# Patient Record
Sex: MALE | Race: WHITE | NOT HISPANIC OR LATINO | Employment: FULL TIME | URBAN - METROPOLITAN AREA
[De-identification: names, ages, dates, MRNs, and addresses within clinical notes are randomized per-mention and may not be internally consistent; named-entity substitution may affect disease eponyms.]

---

## 2017-05-09 DIAGNOSIS — E70.1 PHENYLKETONURIA (PKU) (H): ICD-10-CM

## 2017-10-10 DIAGNOSIS — E70.1 PHENYLKETONURIA (PKU) (H): ICD-10-CM

## 2017-10-30 ENCOUNTER — TELEPHONE (OUTPATIENT)
Dept: CONSULT | Facility: CLINIC | Age: 32
End: 2017-10-30

## 2017-10-30 NOTE — TELEPHONE ENCOUNTER
[facility] Prior Authorization Team   Phone: [phone]  Fax: [phone]    PA Initiation    Medication: KUVAN 100MG - PA INITIATED  Insurance Company: [employer] - Phone [phone] Fax [phone]  Pharmacy Filling the Rx: [facility] - [place], [place] - [address]  Filling Pharmacy Phone: [phone]  Filling Pharmacy Fax: [phone]  Start Date: 10/30/2017

## 2017-10-31 NOTE — TELEPHONE ENCOUNTER
[facility] Prior Authorization Team   Phone: [phone]  Fax: [phone]    Prior Authorization Approval    Authorization Effective Date: 10/31/2017  Authorization Expiration Date: 11/30/2017  Medication: KUVAN 100MG - [place] Approved  Approved Dose/Quantity: 100mg   Reference #: [id number]   Insurance Company: [facility] - Phone [phone] Fax [phone]  Expected CoPay: 0.00     CoPay Card Available:      Foundation Assistance Needed:    Which Pharmacy is filling the prescription (Not needed for infusion/clinic administered): [facility] - [place], [place] - [address] [place] [address] AVE   Pharmacy Notified: Yes  Patient Notified: Yes    Note: Approved only for 1 month good through 11/30/17, patient may need updated PHE labs done for next Prior Auth.

## 2017-11-01 NOTE — TELEPHONE ENCOUNTER
Junior Love at [facility] PA approved but only for one month  Called insurance to verify why was only approved for one month  -found this was a new insurance coverage as of the new year.  -per insurance this can only be approved for one month due to the patients strength and weight  -will need labs to show medication working for renewal, if approved that would be for 6 mo    Insurance will fax renewal form to show what is all needed on a PA renewal

## 2017-11-24 ENCOUNTER — TELEPHONE (OUTPATIENT)
Dept: CONSULT | Facility: CLINIC | Age: 32
End: 2017-11-24

## 2017-11-24 NOTE — TELEPHONE ENCOUNTER
PA Initiation    Medication: Kuvan 100mg - Initiated  Insurance Company: [employer] [place] - Phone [phone] Fax [phone]  [facility] - [place], [place] - [address]  Filling Pharmacy Phone:    Filling Pharmacy Fax:    Start Date: 11/24/2017

## 2017-11-30 ENCOUNTER — HOSPITAL ENCOUNTER (OUTPATIENT)
Facility: CLINIC | Age: 32
Setting detail: SPECIMEN
Discharge: HOME | End: 2017-11-30
Admitting: PEDIATRICS
Payer: COMMERCIAL

## 2017-11-30 PROCEDURE — 84510 ASSAY OF TYROSINE: CPT | Performed by: PEDIATRICS

## 2017-12-05 LAB
PHE SERPL-MCNC: 5.4 MG/DL (ref 0.5–1.6)
TYROSINE SERPL-MCNC: 1.3 MG/DL (ref 0.6–2.4)

## 2017-12-05 NOTE — TELEPHONE ENCOUNTER
Prior Authorization Approval    Authorization Effective Date: 12/1/2017  Authorization Expiration Date: 6/1/2018  Medication: Kuvan 100mg - Approved  Approved Dose/Quantity: 100mg/ 630  Reference #: [id number]   Insurance Company: [employer] [place] - Phone [phone] Fax [phone]  Expected CoPay:     $0.00  CoPay Card Available:      Foundation Assistance Needed:    Which Pharmacy is filling the prescription (Not needed for infusion/clinic administered): [facility] - [place] [address]  Pharmacy Notified: Yes  Patient Notified: Yes

## 2017-12-08 ENCOUNTER — TELEPHONE (OUTPATIENT)
Dept: NUTRITION | Facility: CLINIC | Age: 32
End: 2017-12-08

## 2017-12-08 DIAGNOSIS — E70.1 PKU (PHENYLKETONURIA) (H): Primary | ICD-10-CM

## 2017-12-08 RX ORDER — NUT.TX FOR PKU WITH IRON NO.41 6 G-80/100
4 LIQUID (ML) ORAL DAILY
Qty: 31000 ML | Refills: 12 | Status: SHIPPED | OUTPATIENT
Start: 2017-12-08 | End: 2018-02-01

## 2017-12-08 NOTE — TELEPHONE ENCOUNTER
PHE result collected 11/30/17 given to patient via phone call = 5.4 mg/dL.      Also received request via My [employer] for updated formula Rx, LOMN, and clinical notes.  Reviewed this with patient and verified he is currently taking 4 Glytactin RTD 15's per day.  Patient was last seen in May 2016 and states he has upcoming visit scheduled for May 2018 and also plans to try to get his sibling (who also has PKU) to come along.  Encouraged patient to attend visit and he denied any other needs at this time.  Updated documentation faxed to [employer]

## 2018-02-01 DIAGNOSIS — E70.1 PKU (PHENYLKETONURIA) (H): ICD-10-CM

## 2018-02-01 RX ORDER — NUT.TX FOR PKU WITH IRON NO.41 6 G-80/100
4 LIQUID (ML) ORAL DAILY
Qty: 31000 ML | Refills: 12 | Status: SHIPPED | OUTPATIENT
Start: 2018-02-01

## 2018-02-20 ENCOUNTER — TELEPHONE (OUTPATIENT)
Dept: PEDIATRICS | Age: 33
End: 2018-02-20

## 2018-03-12 ENCOUNTER — OFFICE VISIT (OUTPATIENT)
Dept: PEDIATRICS | Facility: CLINIC | Age: 33
End: 2018-03-12
Attending: GENETIC COUNSELOR, MS
Payer: COMMERCIAL

## 2018-03-12 ENCOUNTER — OFFICE VISIT (OUTPATIENT)
Dept: PEDIATRICS | Facility: CLINIC | Age: 33
End: 2018-03-12
Attending: PEDIATRICS
Payer: COMMERCIAL

## 2018-03-12 ENCOUNTER — OFFICE VISIT (OUTPATIENT)
Dept: PEDIATRICS | Facility: CLINIC | Age: 33
End: 2018-03-12

## 2018-03-12 ENCOUNTER — ALLIED HEALTH/NURSE VISIT (OUTPATIENT)
Dept: PEDIATRICS | Facility: CLINIC | Age: 33
End: 2018-03-12
Attending: DIETITIAN, REGISTERED
Payer: COMMERCIAL

## 2018-03-12 VITALS
BODY MASS INDEX: 30.92 KG/M2 | SYSTOLIC BLOOD PRESSURE: 125 MMHG | HEIGHT: 74 IN | HEART RATE: 88 BPM | DIASTOLIC BLOOD PRESSURE: 86 MMHG | WEIGHT: 240.96 LBS

## 2018-03-12 DIAGNOSIS — Z71.83 ENCOUNTER FOR NONPROCREATIVE GENETIC COUNSELING: ICD-10-CM

## 2018-03-12 DIAGNOSIS — Z31.5 ENCOUNTER FOR PROCREATIVE GENETIC COUNSELING: ICD-10-CM

## 2018-03-12 DIAGNOSIS — E70.1 PHENYLKETONURIA (PKU) (H): Primary | ICD-10-CM

## 2018-03-12 DIAGNOSIS — R42 DIZZINESS: ICD-10-CM

## 2018-03-12 PROCEDURE — 84510 ASSAY OF TYROSINE: CPT

## 2018-03-12 PROCEDURE — 36415 COLL VENOUS BLD VENIPUNCTURE: CPT

## 2018-03-12 PROCEDURE — 96040 ZZH GENETIC COUNSELING, EACH 30 MINUTES: CPT | Mod: ZF | Performed by: GENETIC COUNSELOR, MS

## 2018-03-12 PROCEDURE — 97803 MED NUTRITION INDIV SUBSEQ: CPT | Mod: 59 | Performed by: DIETITIAN, REGISTERED

## 2018-03-12 PROCEDURE — G0463 HOSPITAL OUTPT CLINIC VISIT: HCPCS | Mod: 25

## 2018-03-12 ASSESSMENT — PAIN SCALES - GENERAL: PAINLEVEL: NO PAIN (0)

## 2018-03-12 NOTE — NURSING NOTE
"Chief Complaint   Patient presents with     RECHECK     follow up       Initial /86 (BP Location: Right arm, Patient Position: Sitting, Cuff Size: Adult Large)  Pulse 88  Ht 6' 1.62\" (187 cm)  Wt 240 lb 15.4 oz (109.3 kg)  HC 61 cm (24.02\")  BMI 31.26 kg/m2 Estimated body mass index is 31.26 kg/(m^2) as calculated from the following:    Height as of this encounter: 6' 1.62\" (187 cm).    Weight as of this encounter: 240 lb 15.4 oz (109.3 kg).  Medication Reconciliation: complete     Jason Pleitez LPN      "

## 2018-03-12 NOTE — PROGRESS NOTES
Advanced Therapies  [facility] [address] [facility] [address]  Phone: [phone]  Fax: [phone]  Date: 2018      Patient:  Mukesh Wolff   :   1985   MRN:     [id number]      Mukesh Wolff  [address]  [facility] [address]    Dear Colleagues at [facility] and Mukesh Wolff,    CHIEF COMPLAINT:     I had the pleasure of seeing Mukesh Wolff in the PKU and Maternal PKU Clinic at the [facility] regarding phenylketonuria (PKU). This patient is a 32 year old and comes for evaluation and treatment.      Since the last visit, there have been no hospitalizations, emergency department visits, or other major changes in medical care.    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  He has gained some weight and would be eligible for an increase in Kuvan medication, however, he plans to lose weight and would like to stay on 21 pills 3 times per day.  His last blood phenylalanine level was sent 2 years ago and was just above 5.  He reports no other changes in his medical condition.  No emergency room visits or hospitalizations, and he is in relatively good, stable health, in his opinion.     FAMILY HISTORY: A brief family medical history was reviewed.  MEDICATIONS:   Current Outpatient Prescriptions   Medication Sig     Nutritional Supplements (GLYTACTIN RTD 15) LIQD Take 4 Packages by mouth daily     sapropterin dihydrochloride (KUVAN) 100 MG TBSO Take 2,100 mg by mouth daily .Take 21 tablets by mouth once daily with a meal. May crush tablets and mix with food or applesauce.     No current facility-administered medications for this visit.      REVIEW OF  "SYSTEMS: The review of systems negative for new eye, ear, heart, lung, liver, spleen, gastrointestinal, bone, muscle, integumentary, endocrinologic, brain or psychiatric issues except as noted above.  PHYSICAL EXAMINATION:  Demographics: /86 (BP Location: Right arm, Patient Position: Sitting, Cuff Size: Adult Large)  Pulse 88  Ht 6' 1.62\" (187 cm)  Wt 240 lb 15.4 oz (109.3 kg)  HC 61 cm (24.02\")  BMI 31.26 kg/m2  General: The patient is oriented to person, place and time at an age-appropriate manner.   HEENT: The facial features are normal and symmetric.   The gaze is conjugate and extraocular motions are full and intact.The pupils are equal, round and reactive to light.  The ears are of normal position and configuration and hearing is grossly normal.  The oropharynx is benign and the tongue protrudes normally without fasciculations.  Neck: The neck is supple with full range of motion  Chest: The chest is of normal configuration and clear by auscultation.   Heart: A normal S1 and S2 are heard without murmurs or gallops.  Abdomen: The abdomen is soft and benign without organomegaly.   Extremities: The extremities are of normal configuration without contractures nor hyperlaxities. Strength and tone appear to be normal and symmetric. Deep tendon reflexes are normal at the biceps, patellar and ankles, and there is no clonus at the ankles.   Integument: The integument is  of normal appearance without significant changes in pigmentation, birthmarks, or lesions.  Neurologic:  Mental Status Exam:  Alert, awake. Fully oriented. No dysarthria, no dysphasia. Speech of normal fluency.  Cranial Nerves:  PERRLA, EOMs intact, no nystagmus, facial movements symmetric. No atrophy or fasciculations.    Motor:  Normal tone in all four extremities, no atrophy or fasciculations. 5/5 strength bilaterally in shoulder abduction, elbow extensors and flexors, , hip flexors, knee extensors and flexors. No tremors.  Sensory:  " "Negative Romberg.  Reflexes:  2+ and symmetric in biceps, patellar, Achilles; There is no clonus at the ankles.  Gait:  Normal gait; normal arm swing and stance.ankles.    LABORATORY RESULTS: Previous studies showed pathologically elevated blood phenylalanine levels as well as specific PAH mutatons  and excluded disorders of biopterin recycling. Laboratory studies from the past year were reviewed.    Addendum (March 18, 2018): On a specimen obtained today, Mukesh's phenylalaine level was found to be surprisingly elevated at 18.8 mg/dl.    ASSESSMENT:  1.) Phenylketonuria (PKU).  2.) In the past, there has been excellent control with blood phe levels aiming at levels of 6 mg/dL or lower. Blood phe levels at 5.4 mg/dl since last visit. However, today's level is surprisingly and alarmingly high.     PLAN/RECOMMENDATIONS:    1.) Send blood \"tyrosine and phenylalanine\" levels monthly.  2.) Metabolic PKU Dietician Consult today for regular diet assessment and nutritional management including the patient's prescribed phenylalanine intake. I recommend that Mr. Mendez contact the Metabolic Dietician, and make adjustments to reduce blood phenylalanine to the previous, low levels below 6 mg/dl.  3.) Clinical Pharmacotherapy consultation with Pharmacotherapy for Inborn Errors of Metabolism (PIMD) expert regarding sapropterin dihydrochloride medication for PKU.  4.) Continue low phenylalanine diet (250-400 mg phenylalanine/day, or as modified by consultation with the Metabolic PKU Dietician considering recent blood phenylalanine levels, diet history, and impact of sapropterin, if taken).  5..) Return to PKU Clinic in 12 months, or consider coming earlier in 3-4 months to check on his progress.    FOLLOW-UP PLAN:  If you are returning to clinic to review specific laboratory tests, please call the Genetic Counselor (see phone numbers below) to confirm that we have received all of the results from reference laboratories prior to " "your appointment. If we have not received all of the test results, please discuss re-scheduling your appointment.    I spent 25 minutes face-to-face with the patient reviewing the chief complaint, past medical history, and obtaining a review of systems as well as doing a physical examination; more than 50% of this time was spent in counseling and education regarding Maternal PKU,  the availability of the local patient support group with information on the [place] PKU Foundation available at www.[place]pku.org as well as the importance of maintaining blood phenylalanine levels at 6 mg/dl or less.     With warmest regards,     Augusto Bryant PhD MD  Professor of Pediatrics, and  [place] [employer]      Appointments: [phone]      Monday mornings: Advanced Therapies for Lysosomal Diseases Clinic   Monday afternoons: PKU Clinic, Metabolism Clinic, and Genetics Clinic    Nurse Coordinator, Metabolism and Genetics (afternoon clinics):  Kimberlee Ferraro MA, RN, PHN  [phone]    Pharmacotherapy Consultant:  Daniela Felix PharmD, Pharmacotherapy for Metabolic Disorders (PIMD): [phone]  [facility] Lucy, PharmD, Pharmacotherapy for Metabolic Disorders (PIMD): [phone]    Genetic Counselor:  Kimberlee Renteria MS, [facility] (Genetic test Results): [phone]  Ginny Sanchez MS, GC, (Genetic test results: [phone])    Metabolic Dietician:  Julianna Villa, Registered Dietician: [phone]    :  CORDELL Villalobos, [facility], AC, Clinical , [phone]    Advanced Therapies Clinic Scheduler:  Jacki Cisneros, [phone]      Copy to Practitioner:  [facility]  Moisés Baumann[address] [place] [address]    Copy to patient:  Mukesh Wolff  [address]  [facility] [address]      "

## 2018-03-12 NOTE — LETTER
3/12/2018      RE: Mukesh Wolff  [address] [facility] [address]       Pharmacotherapy Consultation:    S/O:  Mukesh Wolff is a 32 year old male coming to clinic for a follow-up visit (evaluation and treatment) of PKU. The patient's last visit to the PKU clinic was on 5/12/16.     Assessment:    1. PKU -   PKU Genotype:    Mukesh participated in the START trial in 2009 and was determined to be a responder ( > 20% reduction in phe levels with Kuvan  Vs. Placebo). He is currently on 2100mg Kuvan everyday. Based on today's weight, 109.3kg, his Kuvan dose could be increased to 2200mg Kuvan/day (recommended dose: 20mg/kg). Patient would like to keep the dose at 2100mg (21 tablets of 100mg Kuvan) a day because he is working to get his weight down.     Mukesh doesn't have concerns regarding Kuvan tablets such as counting Kuvan tablets everyday and taking medications, but would like to try powder form for few weeks.     Today, he was interested to know more about Pegvaliase because he heard that Pegvaliase could allow more protein intake while keeping phenylalanine in good levels. Pegvaliase is an investigational enzyme substitution therapy for the management of PKU. It is currently under FDA review for possible use in PKU patients.     Current labs include:    Phenylalanine mg/dl   Date Value Ref Range Status   03/12/2018 11.8 (H) 0.5 - 1.6 mg/dL Final   11/30/2017 5.4 (H) 0.5 - 1.6 mg/dL Final   06/20/2016 4.4 (H) 0.5 - 1.6 mg/dL Final     Comment:     Filter paper dried insufficiently before closing.  Blood leaked onto cardboard   cover.  Interpret results with caution.     05/12/2016 12.2 (H) 0.5 - 1.6 mg/dL Final   06/20/2013 17.5 (H) 0.5 - 1.6 mg/dL Final   10/24/2009 11.5 (H) 0.5 - 1.6 mg/dL Final   10/17/2009 8.6 (H) 0.5 - 1.6 mg/dL Final     Comment:     Filter paper dried insufficiently before closing.  Blood leaked onto cardboard   cover.  Interpret results with caution.   10/10/2009 12.5 (H)  0.5 - 1.6 mg/dL Final   10/03/2009 12.4 (H) 0.5 - 1.6 mg/dL Final   09/26/2009 19.0 (H) 0.5 - 1.6 mg/dL Final     Comment:     Filter paper dried insufficiently before closing.  Blood leaked onto cardboard   cover.  Interpret results with caution.   09/12/2009 15.6 (H) 0.5 - 1.6 mg/dL Final   08/29/2009 15.7 (H) 0.5 - 1.6 mg/dL Final   07/25/2009 11.4 (H) 0.5 - 1.6 mg/dL Final   07/18/2009 10.0 (H) 0.5 - 1.6 mg/dL Final   07/11/2009 11.6 (H) 0.5 - 1.6 mg/dL Final   07/04/2009 8.6 (H) 0.5 - 1.6 mg/dL Final   06/27/2009 16.4 (H) 0.5 - 1.6 mg/dL Final   05/02/2009 15.7 (H) 0.5 - 1.6 mg/dL Final   04/25/2009 13.9 (H) 0.5 - 1.6 mg/dL Final   04/18/2009 12.9 (H) 0.5 - 1.6 mg/dL Final   04/11/2009 9.4 (H) 0.5 - 1.6 mg/dL Final     Comment:     Filter paper dried insufficiently before closing.  Blood leaked onto cardboard   cover.  Interpret results with caution.   04/04/2009 17.0 (H) 0.5 - 1.6 mg/dL Final     Comment:     Filter paper dried insufficiently before closing.  Blood leaked onto cardboard   cover.  Interpret results with caution.   03/19/2009 14.4 (H) 0.5 - 1.6 mg/dL Final   01/26/2007 14.4 (H) 0.5 - 1.6 mg/dL Final   08/01/2005 8.3 (H) 0.5 - 1.6 mg/dL Final   06/11/2005 13.1 (H) 0.5 - 1.6 mg/dL Final     Medication reconciliation:   A review of the patient's existing medication list was performed to ensure that it is up to date.     Current Outpatient Prescriptions   Medication     Nutritional Supplements (GLYTACTIN RTD 15) LIQD     sapropterin dihydrochloride (KUVAN) 100 MG TBSO     No current facility-administered medications for this visit.         Pharmacotherapy Plan:  1) Continue taking Kuvan: 21 tabs of Kuvan 100 mg tablets for a total dose of 2100 mg per day. Take each dose with a full meal.  2) Contact [facility] regarding patient's interest in trying Kuvan powder form and possible supply for some trial medications  3) Continue working with PKU Clinic dieticians to maintain low-phe diet in order to  stabilize phe levels within the goal range of 2 - 6 mg/dl.   4) Order labs to obtain a phe level following today's appointment.   5) RTC in 1 year for a follow-up appointment with Dr. Tha Bryant at the PKU Clinic.       Maureen Ayala, XavierD Postdoctoral Fellow  Pharmacotherapy of Inherited Metabolic Disorders (PIMD)    Pharmacotherapy consultation per collaborative practice agreement with Dr. Tha Bryant MD PhD       Maureen Ayala, LTAC, located within [facility]

## 2018-03-12 NOTE — MR AVS SNAPSHOT
MRN:[id number]                      After Visit Summary   3/12/2018    Mukesh Wolff    MRN: [id number]           Visit Information        Provider Department      3/12/2018 3:00 PM Julianna Villa RD Peds PKABNER        Your next 10 appointments already scheduled     Mar 11, 2019  2:30 PM CDT   Return Visit with MD Lisa Pro ([facility])    [facility]  [address] [facility] [facility]  [address] [facility] [address]   [phone]              MyChart Information     [employer] gives you secure access to your electronic health record. If you see a primary care provider, you can also send messages to your care team and make appointments. If you have questions, please call your primary care clinic.  If you do not have a primary care provider, please call [phone] and they will assist you.      [employer] is an electronic gateway that provides easy, online access to your medical records. With [employer], you can request a clinic appointment, read your test results, renew a prescription or communicate with your care team.     To access your existing account, please contact your [facility] or call [phone] for assistance.        Care EveryWhere ID     This is your Care EveryWhere ID. This could be used by other organizations to access your [facility] medical records  [id number]        Equal Access to Services     AMADO VILLA : Hadyousif parisho Sonii, waaxda luqadaha, qaybta kaalmada adeegyajorge, lachelle mckeon. So [facility] [phone].    ATENCIÓN: Si habla español, tiene a mei disposición servicios gratuitos de asistencia lingüística. Llame al [phone].    We comply with applicable federal civil rights laws and [place] laws. We do not discriminate on the basis of race, color, national origin, age, disability, sex, sexual orientation, or gender identity.

## 2018-03-12 NOTE — MR AVS SNAPSHOT
After Visit Summary   3/12/2018    Mukesh Wolff    MRN: [id number]           Patient Information     Date Of Birth          1985        Visit Information        Provider Department      3/12/2018 1:00 PM Tha Bryant MD Peds PKU        Today's Diagnoses     Phenylketonuria (PKU) (H)    -  1    Dizziness           Follow-ups after your visit        Additional Services     OTOLARYNGOLOGY REFERRAL       1.) Your provider has referred you to: [facility]: Adult Ear, Nose and Throat Clinic (Otolaryngology) [facility] [phone]  [email or web address]    2.) Or [facility]    Please be aware that coverage of these services is subject to the terms and limitations of your health insurance plan.  Call member services at your health plan with any benefit or coverage questions.      Please bring the following with you to your appointment:    (1) Any X-Rays, CTs or MRIs which have been performed.  Contact the facility where they were done to arrange for  prior to your scheduled appointment.   (2) List of current medications  (3) This referral request   (4) Any documents/labs given to you for this referral                  Follow-up notes from your care team     Return in about 1 year (around 3/12/2019).      Your next 10 appointments already scheduled     Mar 11, 2019  2:30 PM CDT   Return Visit with MD Lisa Pro PKU ([facility])    [place] [address] [facility], [address] [place] [address]  [address] [place] [address] [phone]              Who to contact     Please call your clinic at [phone] to:    Ask questions about your health    Make or cancel appointments    Discuss your medicines    Learn about your test results    Speak to your doctor            Additional Information About Your Visit        MyChart Information     [employer] gives you secure access to your electronic health record. If you see a primary  "care provider, you can also send messages to your care team and make appointments. If you have questions, please call your primary care clinic.  If you do not have a primary care provider, please call [phone] and they will assist you.      [employer] is an electronic gateway that provides easy, online access to your medical records. With [employer], you can request a clinic appointment, read your test results, renew a prescription or communicate with your care team.     To access your existing account, please contact your [facility] or call [phone] for assistance.        Care EveryWhere ID     This is your Care EveryWhere ID. This could be used by other organizations to access your [facility] medical records  [id number]        Your Vitals Were     Pulse Height Head Circumference BMI (Body Mass Index)          88 6' 1.62\" (187 cm) 61 cm (24.02\") 31.26 kg/m2         Blood Pressure from Last 3 Encounters:   03/12/18 125/86   05/12/16 147/87   06/20/13 117/67    Weight from Last 3 Encounters:   03/12/18 240 lb 15.4 oz (109.3 kg)   05/12/16 231 lb 7.7 oz (105 kg)   06/20/13 217 lb (98.4 kg)              We Performed the Following     OTOLARYNGOLOGY REFERRAL     Tyrosine and phenylalanine        Primary Care Provider Fax #    Physician No Ref-Primary [phone]       No address on file        Equal Access to Services     AMADO VILLA : Hadii sameera ku hadasho Soomaali, waaxda luqadaha, qaybta kaalmada denis, lachelle lynn . So [facility] [phone].    ATENCIÓN: Si habla español, tiene a mei disposición servicios gratuitos de asistencia lingüística. Neena al [phone].    We comply with applicable federal civil rights laws and [place] laws. We do not discriminate on the basis of race, color, national origin, age, disability, sex, sexual orientation, or gender identity.            Thank you!     Thank you for choosing PEDS PKU  for your care. Our goal is " always to provide you with excellent care. Hearing back from our patients is one way we can continue to improve our services. Please take a few minutes to complete the written survey that you may receive in the mail after your visit with us. Thank you!             Your Updated Medication List - Protect others around you: Learn how to safely use, store and throw away your medicines at www.disposemymeds.org.          This list is accurate as of 3/12/18  2:45 PM.  Always use your most recent med list.                   Brand Name Dispense Instructions for use Diagnosis    GLYTACTIN RTD 15 Liqd     56943 mL    Take 4 Packages by mouth daily    PKU (phenylketonuria) (H)       sapropterin dihydrochloride 100 MG Tbso    KUVAN    630 tablet    Take 2,100 mg by mouth daily .Take 21 tablets by mouth once daily with a meal. May crush tablets and mix with food or applesauce.    Phenylketonuria (PKU) (H)

## 2018-03-12 NOTE — MR AVS SNAPSHOT
After Visit Summary   3/12/2018    Mukesh Wolff    MRN: [id number]           Patient Information     Date Of Birth          1985        Visit Information        Provider Department      3/12/2018 1:15 PM Kimberlee Renteria GC Peds PKU        Today's Diagnoses     Phenylketonuria (PKU) (H)    -  1    Encounter for nonprocreative genetic counseling        Encounter for procreative genetic counseling           Follow-ups after your visit        Your next 10 appointments already scheduled     Mar 11, 2019  2:30 PM CDT   Return Visit with MD Lisa Pro PKU ([facility])    [facility]  [address] [facility]  [address] [facility] [id number] [phone]              Who to contact     Please call your clinic at [phone] to:    Ask questions about your health    Make or cancel appointments    Discuss your medicines    Learn about your test results    Speak to your doctor            Additional Information About Your Visit        My[employer]in[employer] Information     [employer] gives you secure access to your electronic health record. If you see a primary care provider, you can also send messages to your care team and make appointments. If you have questions, please call your primary care clinic.  If you do not have a primary care provider, please call [phone] and they will assist you.      [employer] is an electronic gateway that provides easy, online access to your medical records. With [employer], you can request a clinic appointment, read your test results, renew a prescription or communicate with your care team.     To access your existing account, please contact your [facility] or call [phone] for assistance.        Care EveryWhere ID     This is your Care EveryWhere ID. This could be used by other organizations to access your [place] medical records  [id number]         Blood Pressure from Last 3 Encounters:   03/12/18 125/86   05/12/16 147/87    06/20/13 117/67    Weight from Last 3 Encounters:   03/12/18 240 lb 15.4 oz (109.3 kg)   05/12/16 231 lb 7.7 oz (105 kg)   06/20/13 217 lb (98.4 kg)              Today, you had the following     No orders found for display       Primary Care Provider Fax #    Physician No Ref-Primary [phone]       No address on file        Equal Access to Services     FABIANA VILLA : Hadii aad ku hadasho Soomaali, waaxda luqadaha, qaybta kaalmada adeegyada, waxay idiin jackelynn adetori enciso lajoeycatalina . So [facility] [phone].    ATENCIÓN: Si habla matt, tiene a mei disposición servicios gratuitos de asistencia lingüística. Llame al [phone].    We comply with applicable federal civil rights laws and [place] laws. We do not discriminate on the basis of race, color, national origin, age, disability, sex, sexual orientation, or gender identity.            Thank you!     Thank you for choosing PEDS PKU  for your care. Our goal is always to provide you with excellent care. Hearing back from our patients is one way we can continue to improve our services. Please take a few minutes to complete the written survey that you may receive in the mail after your visit with us. Thank you!             Your Updated Medication List - Protect others around you: Learn how to safely use, store and throw away your medicines at www.disposemymeds.org.          This list is accurate as of 3/12/18 11:59 PM.  Always use your most recent med list.                   Brand Name Dispense Instructions for use Diagnosis    GLYTACTIN RTD 15 Liqd     82113 mL    Take 4 Packages by mouth daily    PKU (phenylketonuria) (H)       sapropterin dihydrochloride 100 MG Tbso    KUVAN    630 tablet    Take 2,100 mg by mouth daily .Take 21 tablets by mouth once daily with a meal. May crush tablets and mix with food or applesauce.    Phenylketonuria (PKU) (H)

## 2018-03-12 NOTE — LETTER
3/12/2018      RE: Mukesh Wolff  [address]  [facility] [address]                 [facility]  [facility] [address]  [address] [facility] [address]  Phone: [phone]  Fax: [phone]  Date: 2018      Patient:  Mukesh Wolff   :   1985   MRN:     [id number]      Mukesh Wolff  [address]  [facility] [address]    Dear Colleagues at  and Mukesh DOREEN New,    CHIEF COMPLAINT:     I had the pleasure of seeing Mukesh Wolff in the PKU and Maternal PKU Clinic at the [facility] regarding phenylketonuria (PKU). This patient is a 32 year old and comes for evaluation and treatment.      Since the last visit, there have been no hospitalizations, emergency department visits, or other major changes in medical care.    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  He has gained some weight and would be eligible for an increase in Kuvan medication, however, he plans to lose weight and would like to stay on 21 pills 3 times per day.  His last blood phenylalanine level was sent 2 years ago and was just above 5.  He reports no other changes in his medical condition.  No emergency room visits or hospitalizations, and he is in relatively good, stable health, in his opinion.     FAMILY HISTORY: A brief family medical history was reviewed.  MEDICATIONS:   Current Outpatient Prescriptions   Medication Sig     Nutritional Supplements (GLYTACTIN RTD 15) LIQD Take 4 Packages by mouth daily     sapropterin dihydrochloride (KUVAN) 100 MG TBSO Take 2,100 mg by mouth daily .Take 21 tablets by mouth once daily with a meal. May crush tablets and mix with food or  "applesauce.     No current facility-administered medications for this visit.      REVIEW OF SYSTEMS: The review of systems negative for new eye, ear, heart, lung, liver, spleen, gastrointestinal, bone, muscle, integumentary, endocrinologic, brain or psychiatric issues except as noted above.  PHYSICAL EXAMINATION:  Demographics: /86 (BP Location: Right arm, Patient Position: Sitting, Cuff Size: Adult Large)  Pulse 88  Ht 6' 1.62\" (187 cm)  Wt 240 lb 15.4 oz (109.3 kg)  HC 61 cm (24.02\")  BMI 31.26 kg/m2  General: The patient is oriented to person, place and time at an age-appropriate manner.   HEENT: The facial features are normal and symmetric.   The gaze is conjugate and extraocular motions are full and intact.The pupils are equal, round and reactive to light.  The ears are of normal position and configuration and hearing is grossly normal.  The oropharynx is benign and the tongue protrudes normally without fasciculations.  Neck: The neck is supple with full range of motion  Chest: The chest is of normal configuration and clear by auscultation.   Heart: A normal S1 and S2 are heard without murmurs or gallops.  Abdomen: The abdomen is soft and benign without organomegaly.   Extremities: The extremities are of normal configuration without contractures nor hyperlaxities. Strength and tone appear to be normal and symmetric. Deep tendon reflexes are normal at the biceps, patellar and ankles, and there is no clonus at the ankles.   Integument: The integument is  of normal appearance without significant changes in pigmentation, birthmarks, or lesions.  Neurologic:  Mental Status Exam:  Alert, awake. Fully oriented. No dysarthria, no dysphasia. Speech of normal fluency.  Cranial Nerves:  PERRLA, EOMs intact, no nystagmus, facial movements symmetric. No atrophy or fasciculations.    Motor:  Normal tone in all four extremities, no atrophy or fasciculations. 5/5 strength bilaterally in shoulder abduction, elbow " "extensors and flexors, , hip flexors, knee extensors and flexors. No tremors.  Sensory:  Negative Romberg.  Reflexes:  2+ and symmetric in biceps, patellar, Achilles; There is no clonus at the ankles.  Gait:  Normal gait; normal arm swing and stance.ankles.    LABORATORY RESULTS: Previous studies showed pathologically elevated blood phenylalanine levels as well as specific PAH mutatons  and excluded disorders of biopterin recycling. Laboratory studies from the past year were reviewed.    Addendum (March 18, 2018): On a specimen obtained today, Mukesh's phenylalaine level was found to be surprisingly elevated at 18.8 mg/dl.    ASSESSMENT:  1.) Phenylketonuria (PKU).  2.) In the past, there has been excellent control with blood phe levels aiming at levels of 6 mg/dL or lower. Blood phe levels at 5.4 mg/dl since last visit. However, today's level is surprisingly and alarmingly high.     PLAN/RECOMMENDATIONS:    1.) Send blood \"tyrosine and phenylalanine\" levels monthly.  2.) Metabolic PKU Dietician Consult today for regular diet assessment and nutritional management including the patient's prescribed phenylalanine intake. I recommend that Mr. Mendez contact the Metabolic Dietician, and make adjustments to reduce blood phenylalanine to the previous, low levels below 6 mg/dl.  3.) Clinical Pharmacotherapy consultation with Pharmacotherapy for Inborn Errors of Metabolism (PIMD) expert regarding sapropterin dihydrochloride medication for PKU.  4.) Continue low phenylalanine diet (250-400 mg phenylalanine/day, or as modified by consultation with the Metabolic PKU Dietician considering recent blood phenylalanine levels, diet history, and impact of sapropterin, if taken).  5..) Return to PKU Clinic in 12 months, or consider coming earlier in 3-4 months to check on his progress.    FOLLOW-UP PLAN:  If you are returning to clinic to review specific laboratory tests, please call the Genetic Counselor (see phone numbers " "below) to confirm that we have received all of the results from reference laboratories prior to your appointment. If we have not received all of the test results, please discuss re-scheduling your appointment.    I spent 25 minutes face-to-face with the patient reviewing the chief complaint, past medical history, and obtaining a review of systems as well as doing a physical examination; more than 50% of this time was spent in counseling and education regarding Maternal PKU,  the availability of the local patient support group with information on the [place] PKU Foundation available at www.[place]pku.org as well as the importance of maintaining blood phenylalanine levels at 6 mg/dl or less.     With warmest regards,     Augusto Bryant PhD, MD  Professor of Pediatrics, and  [employer]      Appointments: [phone]      Monday mornings: [facility]   Monday afternoons: PKU Clinic, Metabolism Clinic, and Genetics Clinic    Nurse Coordinator, Metabolism and Genetics (afternoon clinics):  Kimberlee Ferraro MA, RN, PHN  [phone]    Pharmacotherapy Consultant:  Daniela Felix, PharmD, Pharmacotherapy for Metabolic Disorders (PIMD): [phone]  [facility] Lucy, PharmD, Pharmacotherapy for Metabolic Disorders (PIMD): [phone]    Genetic Counselor:  Kimberlee Renteria MS, [facility] (Genetic test Results): [phone]  Ginny Sanchez MS, GC, (Genetic test results: [phone])    Metabolic Dietician:  Julianna Villa, Registered Dietician: [phone]    :  CORDELL Villalobos, [facility], [facility], Clinical , [phone]    [facility] Clinic Scheduler:  Jacki Cisneros, [phone]      Copy to Practitioner:  [place] [address] [place] [address]    Copy to patient:  Mukesh Wolff  [address]  [facility] [address]          "

## 2018-03-12 NOTE — MR AVS SNAPSHOT
After Visit Summary   3/12/2018    Mukesh Wolff    MRN: [id number]           Patient Information     Date Of Birth          1985        Visit Information        Provider Department      3/12/2018 2:22 PM Maureen Ayala RPH Peds PKU        Today's Diagnoses     Phenylketonuria (PKU) (H)    -  1       Follow-ups after your visit        Your next 10 appointments already scheduled     Mar 11, 2019  2:30 PM CDT   Return Visit with MD Lisa Pro PKU ([facility])    [facility]  [address]  [address] [facility] [id number] [phone]              Who to contact     Please call your clinic at [phone] to:    Ask questions about your health    Make or cancel appointments    Discuss your medicines    Learn about your test results    Speak to your doctor            Additional Information About Your Visit        MyChart Information     [employer] gives you secure access to your electronic health record. If you see a primary care provider, you can also send messages to your care team and make appointments. If you have questions, please call your primary care clinic.  If you do not have a primary care provider, please call [phone] and they will assist you.      [employer] is an electronic gateway that provides easy, online access to your medical records. With [employer], you can request a clinic appointment, read your test results, renew a prescription or communicate with your care team.     To access your existing account, please contact your [facility] or call [phone] for assistance.        Care EveryWhere ID     This is your Care EveryWhere ID. This could be used by other organizations to access your [place] medical records  [id number]         Blood Pressure from Last 3 Encounters:   03/12/18 125/86   05/12/16 147/87   06/20/13 117/67    Weight from Last 3 Encounters:   03/12/18 240 lb 15.4 oz (109.3 kg)   05/12/16 231  lb 7.7 oz (105 kg)   06/20/13 217 lb (98.4 kg)              Today, you had the following     No orders found for display       Primary Care Provider Fax #    Physician No Ref-Primary [phone]       No address on file        Equal Access to Services     AMADO VILLA : Ralph brasher javon zachariah Jones, waarianada luqadaha, qaybta kaalmada adepaulette, lachelle enciso lajoeycatalina . So [facility] [phone].    ATENCIÓN: Si habla español, tiene a mei disposición servicios gratuitos de asistencia lingüística. Llame al [phone].    We comply with applicable federal civil rights laws and [place] laws. We do not discriminate on the basis of race, color, national origin, age, disability, sex, sexual orientation, or gender identity.            Thank you!     Thank you for choosing PEDS PKU  for your care. Our goal is always to provide you with excellent care. Hearing back from our patients is one way we can continue to improve our services. Please take a few minutes to complete the written survey that you may receive in the mail after your visit with us. Thank you!             Your Updated Medication List - Protect others around you: Learn how to safely use, store and throw away your medicines at www.disposemymeds.org.          This list is accurate as of 3/12/18 11:59 PM.  Always use your most recent med list.                   Brand Name Dispense Instructions for use Diagnosis    GLYTACTIN RTD 15 Liqd     53447 mL    Take 4 Packages by mouth daily    PKU (phenylketonuria) (H)       sapropterin dihydrochloride 100 MG Tbso    KUVAN    630 tablet    Take 2,100 mg by mouth daily .Take 21 tablets by mouth once daily with a meal. May crush tablets and mix with food or applesauce.    Phenylketonuria (PKU) (H)

## 2018-03-13 LAB
PHE SERPL-MCNC: 11.8 MG/DL (ref 0.5–1.6)
TYROSINE SERPL-MCNC: 1.1 MG/DL (ref 0.6–2.4)

## 2018-03-13 NOTE — PROGRESS NOTES
Pharmacotherapy Consultation:    S/O:  Mukesh Wolff is a 32 year old male coming to clinic for a follow-up visit (evaluation and treatment) of PKU. The patient's last visit to the PKU clinic was on 5/12/16.     Assessment:    1. PKU -   PKU Genotype:    Mukesh participated in the START trial in 2009 and was determined to be a responder ( > 20% reduction in phe levels with Kuvan  Vs. Placebo). He is currently on 2100mg Kuvan everyday. Based on today's weight, 109.3kg, his Kuvan dose could be increased to 2200mg Kuvan/day (recommended dose: 20mg/kg). Patient would like to keep the dose at 2100mg (21 tablets of 100mg Kuvan) a day because he is working to get his weight down.     Mukesh doesn't have concerns regarding Kuvan tablets such as counting Kuvan tablets everyday and taking medications, but would like to try powder form for few weeks.     Today, he was interested to know more about Pegvaliase because he heard that Pegvaliase could allow more protein intake while keeping phenylalanine in good levels. Pegvaliase is an investigational enzyme substitution therapy for the management of PKU. It is currently under FDA review for possible use in PKU patients.     Current labs include:    Phenylalanine mg/dl   Date Value Ref Range Status   03/12/2018 11.8 (H) 0.5 - 1.6 mg/dL Final   11/30/2017 5.4 (H) 0.5 - 1.6 mg/dL Final   06/20/2016 4.4 (H) 0.5 - 1.6 mg/dL Final     Comment:     Filter paper dried insufficiently before closing.  Blood leaked onto cardboard   cover.  Interpret results with caution.     05/12/2016 12.2 (H) 0.5 - 1.6 mg/dL Final   06/20/2013 17.5 (H) 0.5 - 1.6 mg/dL Final   10/24/2009 11.5 (H) 0.5 - 1.6 mg/dL Final   10/17/2009 8.6 (H) 0.5 - 1.6 mg/dL Final     Comment:     Filter paper dried insufficiently before closing.  Blood leaked onto cardboard   cover.  Interpret results with caution.   10/10/2009 12.5 (H) 0.5 - 1.6 mg/dL Final   10/03/2009 12.4 (H) 0.5 - 1.6 mg/dL Final   09/26/2009 19.0  (H) 0.5 - 1.6 mg/dL Final     Comment:     Filter paper dried insufficiently before closing.  Blood leaked onto cardboard   cover.  Interpret results with caution.   09/12/2009 15.6 (H) 0.5 - 1.6 mg/dL Final   08/29/2009 15.7 (H) 0.5 - 1.6 mg/dL Final   07/25/2009 11.4 (H) 0.5 - 1.6 mg/dL Final   07/18/2009 10.0 (H) 0.5 - 1.6 mg/dL Final   07/11/2009 11.6 (H) 0.5 - 1.6 mg/dL Final   07/04/2009 8.6 (H) 0.5 - 1.6 mg/dL Final   06/27/2009 16.4 (H) 0.5 - 1.6 mg/dL Final   05/02/2009 15.7 (H) 0.5 - 1.6 mg/dL Final   04/25/2009 13.9 (H) 0.5 - 1.6 mg/dL Final   04/18/2009 12.9 (H) 0.5 - 1.6 mg/dL Final   04/11/2009 9.4 (H) 0.5 - 1.6 mg/dL Final     Comment:     Filter paper dried insufficiently before closing.  Blood leaked onto cardboard   cover.  Interpret results with caution.   04/04/2009 17.0 (H) 0.5 - 1.6 mg/dL Final     Comment:     Filter paper dried insufficiently before closing.  Blood leaked onto cardboard   cover.  Interpret results with caution.   03/19/2009 14.4 (H) 0.5 - 1.6 mg/dL Final   01/26/2007 14.4 (H) 0.5 - 1.6 mg/dL Final   08/01/2005 8.3 (H) 0.5 - 1.6 mg/dL Final   06/11/2005 13.1 (H) 0.5 - 1.6 mg/dL Final     Medication reconciliation:   A review of the patient's existing medication list was performed to ensure that it is up to date.     Current Outpatient Prescriptions   Medication     Nutritional Supplements (GLYTACTIN RTD 15) LIQD     sapropterin dihydrochloride (KUVAN) 100 MG TBSO     No current facility-administered medications for this visit.         Pharmacotherapy Plan:  1) Continue taking Kuvan: 21 tabs of Kuvan 100 mg tablets for a total dose of 2100 mg per day. Take each dose with a full meal.  2) Contact [facility] regarding patient's interest in trying Kuvan powder form and possible supply for some trial medications  3) Continue working with PKU Clinic dieticians to maintain low-phe diet in order to stabilize phe levels within the goal range of 2 - 6 mg/dl.   4) Order labs to obtain a  phe level following today's appointment.   5) RTC in 1 year for a follow-up appointment with Dr. Tha Bryant at the PKU Clinic.       Maureen Ayala, PharmD Postdoctoral Fellow  Pharmacotherapy of Inherited Metabolic Disorders (PIMD)    Pharmacotherapy consultation per collaborative practice agreement with Dr. Tha Bryant MD PhD

## 2018-03-14 NOTE — PROGRESS NOTES
"Presenting Information:  Mukesh \"Sachin\" New is a 32-year-old man with phenylketonuria (PKU).  His genotype is c.1222C>T (p.[id number]) and c.194T>C (p.Ynd04Vvw).  Sachin returned to clinic today for follow-up with Dr. Augusto Bryant.  I met with Sachin at the request of Dr. Bryant to obtain a family history, review the genetics and inheritance of PKU, and discuss the results of Sachin's genetic testing.    Family History:  A detailed pedigree was obtained and scanned into the electronic medical record.  It is significant for the following:    Sachin is the oldest of four brothers and has no children by choice.      Sachin has a 31-year-old brother who has an unknown eye disease but is otherwise healthy and has four healthy children.    Sachin's next brother, Dung, is 29-years-old and also has PKU.  This man has three healthy sons. Per Sachin, Dung has not been seen in the PKU clinic for many years but plans to re-establish care soon.  We reviewed that we would expect Dung to have the same genotype as Sachin and to be Kuvan-responsive as well.    Sachin's youngest brother is 22-years-old and healthy.  This man has two healthy children and a third child who passed away at 8 hours of age due to a congenital heart defect.  Sachin could not recall the specific diagnosis.    Sachin's parents are both in their 50s and healthy.    There is no other known family history of PKU.    Sachin is of mixed , [place], and [place] ancestry on his maternal side and [place] and mixed  ancestry on his paternal side.  Consanguinity was denied.    Discussion:  We first reviewed the genetics of PKU. Genes are long stretches of DNA that are responsible for how our bodies look and how our bodies work. We all have two copies of every gene; one inherited from the mother and one inherited from the father. When there is a change, called a mutation, in a gene it can cause it to not do its job correctly which can cause the signs and symptoms of a " genetic condition. PKU is caused by mutations in a gene called PAH. The PAH gene is normally important for creating an enzyme that breaks down phenylalanine (Phe). Mutations in the PAH gene cause this enzyme to not do its job the way it is supposed to, resulting in phenylalanine not being broken down. This is toxic to the cells and unless a low phe diet is followed, has serious health and developmental consequences.    There was documentation in Sachin's chart that he had genetic testing in 1998 through the [facility].  However, a report was not available and was unable to be retrieved by the lab.  Testing was therefore repeated on the original archived sample and the following mutations were found in his PAH gene: c.1222C>T (p.Asy002Liv) and c.194T>C (p.Geq51Dqh).  The notation of these mutations refers to the location and the effect of the mutations. The  c.1222C>T (p.Veg335Zyb) is a common mutation associated with classic PKU with little or know residual enzyme activity.  The c.194T>C (p.Gkd70Yuz) mutation, in contrast, is variable but most often associated with moderate PKU.  This genotype has been reported to be Kuvan responsive which is in line with what has been seen clinically for Mr. Wolff     PKU is inherited in an autosomal recessive pattern. This means that to be affected an individual must inherit a mutation in both copies of the PAH gene (one from each parent). Individuals with just one mutation in the PAH gene are said to be carriers. Carriers do not have PKU but can have an affected child if their partner is also a carrier. When both parents are carriers, with each pregnancy there is a 25% chance for the child to be affected, a 50% chance for the child to be unaffected and a carrier, and a 25% chance for the child to be unaffected and not a carrier.      Sachin's two brothers who do not have PKU each have a 2/3 (67%) chance to be a carrier for PKU and carrier testing would be  available to them if desired.  If they are found to be a carrier, testing would be available to their partner.  Similarly, other family members also have a high chance to be carriers for PKU and testing would be available to them and their partners.  We would be happy to help facilitate this for any interested family member.     For Sachin himself, because both copies of his PAH gene have a mutation, no matter which copy he passes down, all of his future children would at least be a carrier for PKU. Whether or not they would be affected depends on whether Sachin's future partner is a carrier for PKU.  If she were, there would be a 50% chance for a child to have PKU. Approximately 1/50 individuals is a carrier for PKU, and testing would be available to Sachin's future partner if they wished to clarify their chances of having a child with PKU.  If and when Sachin decides to begin a family this can be discussed in more detail.     It was a pleasure meeting with Sachin today and my contact information was shared with him should he have any additional questions or concerns.     Plan:  1.  Additional genetic counseling if and when Sachin starts thinking about having children of his own  2.  Follow-up as recommended by Dr. Bryant and Julianna Renteria MS [facility]  Genetic Counselor  Division of Genetics and Metabolism    Total time spent in consultation with the family was approximately 25 minutes    Cc: No letter

## 2018-03-16 ENCOUNTER — TELEPHONE (OUTPATIENT)
Dept: NUTRITION | Facility: CLINIC | Age: 33
End: 2018-03-16

## 2018-04-24 DIAGNOSIS — E70.1 PHENYLKETONURIA (PKU) (H): Primary | ICD-10-CM

## 2018-04-24 RX ORDER — SAPROPTERIN DIHYDROCHLORIDE 100 MG/1
100 POWDER, FOR SOLUTION ORAL
Qty: 30 EACH | Refills: 11
Start: 2018-04-24 | End: 2020-04-06

## 2018-04-24 RX ORDER — SAPROPTERIN DIHYDROCHLORIDE 500 MG/1
2000 POWDER, FOR SOLUTION ORAL
Qty: 120 EACH | Refills: 11
Start: 2018-04-24 | End: 2020-04-06

## 2018-06-04 ENCOUNTER — TELEPHONE (OUTPATIENT)
Dept: PEDIATRICS | Facility: CLINIC | Age: 33
End: 2018-06-04

## 2018-06-04 NOTE — TELEPHONE ENCOUNTER
Prior Authorization Specialty Medication Request    Medication/Dose: Kuvan 100mg and 500mg powder  ICD code (if different than what is on RX):    PKU (phenylketonuria) (H)  E70.0       Previously Tried and Failed:      Important Lab Values: see phe labs  Rationale:     Pharmacy Information (if different than what is on RX)  Name:  [facility]  Phone:

## 2018-06-05 ENCOUNTER — TELEPHONE (OUTPATIENT)
Dept: PEDIATRICS | Facility: CLINIC | Age: 33
End: 2018-06-05

## 2018-06-05 NOTE — TELEPHONE ENCOUNTER
Creating separate encounter for 500mg strength.    PA Initiation    Medication: Kuvan 100mg powder  Insurance Company: [employer] - Phone [phone] Fax [phone]  Pharmacy Filling the Rx: [facility] [address]  Filling Pharmacy Phone: [phone]  Filling Pharmacy Fax:    Start Date: 6/5/2018    Central Prior Authorization Team   Phone: [phone]      Manually faxed prior auth request to [employer] for renewal, fax# [phone]

## 2018-06-05 NOTE — TELEPHONE ENCOUNTER
PA Initiation    Medication: Kuvan 500mg powder  Insurance Company: [employer] - Phone [phone] Fax [phone]  Pharmacy Filling the Rx: [facility] [address]  Filling Pharmacy Phone: [phone]  Filling Pharmacy Fax:    Start Date: 6/5/2018    Central Prior Authorization Team   Phone: [phone]      Manually faxed prior auth request to [employer] for renewal, fax# [phone]

## 2018-06-06 NOTE — TELEPHONE ENCOUNTER
Prior Authorization Not Needed per Insurance    Medication: Kuvan 100mg & 500mg powder  Insurance Company: [employer] - Phone [phone] Fax [phone]  Pharmacy Filling the Rx: [address]  Pharmacy Notified: No  Patient Notified: No    Called [employer] at [phone] as per response back states that medication is covered and want to confirm. Per representative she confirmed that the 100mg and 500mg did not need a prior authorization, that it did go through last 05/29/18 and the reason for the rejection previously was that the pharmacy was running more than the quantity limit.

## 2018-06-06 NOTE — TELEPHONE ENCOUNTER
Prior Authorization Not Needed per Insurance    Medication: Kuvan 100mg & 500mg powder  Insurance Company: [employer] - Phone [phone] Fax [phone]  Pharmacy Filling the Rx: [address]  Pharmacy Notified: No  Patient Notified: No    Called [employer] at [phone] as per response back states that medication is covered and want to confirm. Per representative she confirmed that the 100mg and 500mg did not need a prior authorization, that it did go through last 05/29/18 and the reason for the rejection previously was that the pharmacy was running more than the quantity limit.

## 2018-07-02 ENCOUNTER — TELEPHONE (OUTPATIENT)
Dept: FAMILY MEDICINE | Facility: CLINIC | Age: 33
End: 2018-07-02

## 2018-07-02 NOTE — TELEPHONE ENCOUNTER
Patient returned call, he scheduled via [employer] for [facility], has never been seen here or by her before.    I advised him she is not taking new patients; he rescheduled with Coleen after discussing dizziness:  Has had off and on mild dizzy spells for past 1-1.5 years.   Right ear sometimes plugged.  Denies chest pain, falling or fainting, one sided weakness.    Has never been seen for this before.    Advised he seek eval in ER for sudden worsened symptoms, scheduled to be seen this Friday.    Patient verbalized understanding of and agreement with plan.      Sofie Nguyen RN  [facility]

## 2018-07-02 NOTE — TELEPHONE ENCOUNTER
Called patient at [phone] (home) to inquire about patient concern of dizziness. Unable to reach, patient/family was instructed to return call to [facility] RN directly on the RN call back line at [phone].    Ella Mann RN  [facility]

## 2018-07-02 NOTE — TELEPHONE ENCOUNTER
"Patient scheduled an appointment via [employer] on 7-24-18 for reason: \"Regular check-up.  Also, concerned about dizziness.\"    TC thought that this should be triaged?  "

## 2018-07-06 ENCOUNTER — OFFICE VISIT (OUTPATIENT)
Dept: FAMILY MEDICINE | Facility: CLINIC | Age: 33
End: 2018-07-06
Payer: COMMERCIAL

## 2018-07-06 ENCOUNTER — TELEPHONE (OUTPATIENT)
Dept: FAMILY MEDICINE | Facility: CLINIC | Age: 33
End: 2018-07-06

## 2018-07-06 VITALS
TEMPERATURE: 97.6 F | BODY MASS INDEX: 29.87 KG/M2 | OXYGEN SATURATION: 97 % | DIASTOLIC BLOOD PRESSURE: 84 MMHG | HEART RATE: 80 BPM | SYSTOLIC BLOOD PRESSURE: 125 MMHG | WEIGHT: 230.25 LBS

## 2018-07-06 DIAGNOSIS — H61.21 IMPACTED CERUMEN OF RIGHT EAR: ICD-10-CM

## 2018-07-06 DIAGNOSIS — E70.1 PKU (PHENYLKETONURIA) (H): ICD-10-CM

## 2018-07-06 DIAGNOSIS — M08.3: ICD-10-CM

## 2018-07-06 DIAGNOSIS — R42 DIZZINESS: Primary | ICD-10-CM

## 2018-07-06 LAB
ALBUMIN SERPL-MCNC: 4.2 G/DL (ref 3.4–5)
ALP SERPL-CCNC: 66 U/L (ref 40–150)
ALT SERPL W P-5'-P-CCNC: 24 U/L (ref 0–70)
ANION GAP SERPL CALCULATED.3IONS-SCNC: 9 MMOL/L (ref 3–14)
AST SERPL W P-5'-P-CCNC: 18 U/L (ref 0–45)
BILIRUB SERPL-MCNC: 0.6 MG/DL (ref 0.2–1.3)
BUN SERPL-MCNC: 9 MG/DL (ref 7–30)
CALCIUM SERPL-MCNC: 9 MG/DL (ref 8.5–10.1)
CHLORIDE SERPL-SCNC: 108 MMOL/L (ref 94–109)
CO2 SERPL-SCNC: 25 MMOL/L (ref 20–32)
CREAT SERPL-MCNC: 0.8 MG/DL (ref 0.66–1.25)
ERYTHROCYTE [DISTWIDTH] IN BLOOD BY AUTOMATED COUNT: 12.4 % (ref 10–15)
GFR SERPL CREATININE-BSD FRML MDRD: >90 ML/MIN/1.7M2
GLUCOSE SERPL-MCNC: 96 MG/DL (ref 70–99)
HBA1C MFR BLD: 5 % (ref 0–5.6)
HCT VFR BLD AUTO: 43.6 % (ref 40–53)
HGB BLD-MCNC: 15 G/DL (ref 13.3–17.7)
MCH RBC QN AUTO: 32 PG (ref 26.5–33)
MCHC RBC AUTO-ENTMCNC: 34.4 G/DL (ref 31.5–36.5)
MCV RBC AUTO: 93 FL (ref 78–100)
PLATELET # BLD AUTO: 286 10E9/L (ref 150–450)
POTASSIUM SERPL-SCNC: 4.2 MMOL/L (ref 3.4–5.3)
PROT SERPL-MCNC: 7.5 G/DL (ref 6.8–8.8)
RBC # BLD AUTO: 4.69 10E12/L (ref 4.4–5.9)
SODIUM SERPL-SCNC: 142 MMOL/L (ref 133–144)
TSH SERPL DL<=0.005 MIU/L-ACNC: 1.94 MU/L (ref 0.4–4)
VIT B12 SERPL-MCNC: 522 PG/ML (ref 193–986)
WBC # BLD AUTO: 6.6 10E9/L (ref 4–11)

## 2018-07-06 PROCEDURE — 36415 COLL VENOUS BLD VENIPUNCTURE: CPT | Performed by: NURSE PRACTITIONER

## 2018-07-06 PROCEDURE — 85027 COMPLETE CBC AUTOMATED: CPT | Performed by: NURSE PRACTITIONER

## 2018-07-06 PROCEDURE — 84443 ASSAY THYROID STIM HORMONE: CPT | Performed by: NURSE PRACTITIONER

## 2018-07-06 PROCEDURE — 69210 REMOVE IMPACTED EAR WAX UNI: CPT | Mod: RT | Performed by: NURSE PRACTITIONER

## 2018-07-06 PROCEDURE — 82607 VITAMIN B-12: CPT | Performed by: NURSE PRACTITIONER

## 2018-07-06 PROCEDURE — 83036 HEMOGLOBIN GLYCOSYLATED A1C: CPT | Performed by: NURSE PRACTITIONER

## 2018-07-06 PROCEDURE — 80053 COMPREHEN METABOLIC PANEL: CPT | Performed by: NURSE PRACTITIONER

## 2018-07-06 PROCEDURE — 93005 ELECTROCARDIOGRAM TRACING: CPT | Performed by: NURSE PRACTITIONER

## 2018-07-06 PROCEDURE — 99214 OFFICE O/P EST MOD 30 MIN: CPT | Mod: 25 | Performed by: NURSE PRACTITIONER

## 2018-07-06 NOTE — TELEPHONE ENCOUNTER
Patient was seen today and referred to PT at the [facility].    I called number listed; on hold, chose option to leave a message and left message advising I was returning call to Noelle and requested she call us back at RN line:  [phone].    Sofie Nguyen, SILVIA  [facility]

## 2018-07-06 NOTE — TELEPHONE ENCOUNTER
Reason for Call:  Other     Detailed comments: Noelle requesting for nurse to call back regarding referral, please advise.     Phone Number Patient can be reached at: Other phone number:  [phone]    Best Time: Anytime    Can we leave a detailed message on this number? YES    Call taken on 7/6/2018 at 2:25 PM by Carolyne Calvillo

## 2018-07-06 NOTE — PATIENT INSTRUCTIONS
I recommend taking 1 spoonful metamucil daily with a large glass of water to help prevent constipation  Use over the counter hydrocortisone on hemorrhoids 2-3x/day (generic is fine)  You can wipe with Tucks or wipes with witch hazel  I referred you to the [facility]  Their closest clinic is [place]. You can call [phone] to schedule. We will fax a referral there    Treating Constipation    Constipation is a common and often uncomfortable problem. Constipation means you have bowel movements fewer than 3 times per week, or strain to pass hard, dry stool. It can last a short time. Or it can be a problem that never seems to go away. The good news is that it can often be treated and controlled.  Eat more fiber  One of the best ways to help treat constipation is to increase your fiber intake. You can do this either through diet or by using fiber supplements. Fiber (in whole grains, fruits, and vegetables) adds bulk and absorbs water to soften the stool. This helps the stool pass through the colon more easily. When you increase your fiber intake, do it slowly to avoid side effects such as bloating. Also increase the amount of water that you drink. Eating more of the following foods can add fiber to your diet.    High-fiber cereals    Whole grains, bran, and brown rice    Vegetables such as carrots, broccoli, and greens    Fresh fruits (especially apples, pears, and dried fruits like raisins and apricots)    Nuts and legumes (especially beans such as lentils, kidney beans, and lima beans)  Get physically active  Exercise helps improve the working of your colon which helps ease constipation. Try to get some physical activity every day. If you haven t been active for a while, talk to your healthcare provider before starting again.  Laxatives  Your healthcare provider may suggest an over-the-counter product to help ease your constipation. He or she may suggest the use of bulk-forming agents or  laxatives. The use of laxatives, if used as directed, is common and safe. Follow directions carefully when using them. See your healthcare provider for new-onset constipation, or long-term constipation, to rule out other causes such as medicines or thyroid disease.  Date Last Reviewed: 7/1/2016 2000-2017 [employer]. [address] [place] [address], [place] [address]. All rights reserved. This information is not intended as a substitute for professional medical care. Always follow your healthcare professional's instructions.        Constipation (Adult)  Constipation means that you have bowel movements that are less frequent than usual. Stools often become very hard and difficult to pass.  Constipation is very common. At some point in life it affects almost everyone. Since everyone's bowel habits are different, what is constipation to one person may not be to another. Your healthcare provider may do tests to diagnose constipation. It depends on what he or she finds when evaluating you.    Symptoms of constipation include:    Abdominal pain    Bloating    Vomiting    Painful bowel movements    Itching, swelling, bleeding, or pain around the anus  Causes  Constipation can have many causes. These include:    Diet low in fiber    Too much dairy    Not drinking enough liquids    Lack of exercise or physical activity. This is especially true for older adults.    Changes in lifestyle or daily routine, including pregnancy, aging, work, and travel    Frequent use or misuse of laxatives    Ignoring the urge to have a bowel movement or delaying it until later    Medicines, such as certain prescription pain medicines, iron supplements, antacids, certain antidepressants, and calcium supplements    Diseases like irritable bowel syndrome, bowel obstructions, stroke, diabetes, thyroid disease, Parkinson disease, hemorrhoids, and colon cancer  Complications  Potential complications of constipation can  include:    Hemorrhoids    Rectal bleeding from hemorrhoids or anal fissures (skin tears)    Hernias    Dependency on laxatives    Chronic constipation    Fecal impaction    Bowel obstruction or perforation  Home care  All treatment should be done after talking with your healthcare provider. This is especially true if you have another medical problems, are taking prescription medicines, or are an older adult. Treatment most often involves lifestyle changes. You may also need medicines. Your healthcare provider will tell you which will work best for you. Follow the advice below to help avoid this problem in the future.  Lifestyle changes  These lifestyle changes can help prevent constipation:    Diet. Eat a high-fiber diet, with fresh fruit and vegetables, and reduce dairy intake, meats, and processed foods    Fluids. It's important to get enough fluids each day. Drink plenty of water when you eat more fiber. If you are on diet that limits the amount of fluid you can have, talk about this with your healthcare provider.    Regular exercise. Check with your healthcare provider first.  Medicines  Take any medicines as directed. Some laxatives are safe to use only every now and then. Others can be taken on a regular basis. Talk with your doctor or pharmacist if you have questions.  Prescription pain medicines can cause constipation. If you are taking this kind of medicine, ask your healthcare provider if you should also take a stool softener.  Medicines you may take to treat constipation include:    Fiber supplements    Stool softeners    Laxatives    Enemas    Rectal suppositories  Follow-up care  Follow up with your healthcare provider if symptoms don't get better in the next few days. You may need to have more tests or see a specialist.  Call 911  Call 911 if any of these occur:    Trouble breathing    Stiff, rigid abdomen that is severely painful to touch    Confusion    Fainting or loss of consciousness    Rapid  heart rate    Chest pain  When to seek medical advice  Call your healthcare provider right away if any of these occur:    Fever of 100.4 F (38 C) or higher, or as directed by your healthcare provider    Failure to resume normal bowel movements    Pain in your abdomen or back gets worse    Nausea or vomiting    Swelling in your abdomen    Blood in the stool    Black, tarry stool    Involuntary weight loss    Weakness  Date Last Reviewed: 12/30/2015 2000-2017 [employer]. [address] [place] [address], [place] [address]. All rights reserved. This information is not intended as a substitute for professional medical care. Always follow your healthcare professional's instructions.        Hemorrhoids    Hemorrhoids are swollen and inflamed veins inside the rectum and near the anus. The rectum is the last several inches of the colon. The anus is the passage between the rectum and the outside of the body.  Causes  The veins can become swollen due to increased pressure in them. This is most often caused by:    Chronic constipation or diarrhea    Straining when having a bowel movement    Sitting too long on the toilet    A low-fiber diet    Pregnancy  Symptoms    Bleeding from the rectum (this may be noticeable after bowel movements)    Lump near the anus    Itching around the anus    Pain around the anus  There are different types of hemorrhoids. Depending on the type you have and the severity, you may be able to treat yourself at home. In some cases, a procedure may be the best treatment option. Your healthcare provider can tell you more about this, if needed.  Home care  General care    To get relief from pain or itching, try:  ? Medicines. Your healthcare provider may recommend stool softeners, suppositories, or laxatives to help manage constipation. Use these exactly as directed.  ? Sitz baths. A sitz bath involves sitting in a few inches of warm bath water. Be careful not to make the water so hot that you burn  yourself--test it before sitting in it. Soak for about 10 to 15 minutes a few times a day. This may help relieve pain.  ? Topical products. Your healthcare provider may prescribe or recommend creams, ointments, or pads that can be applied to the hemorrhoid. Use these exactly as directed.  Tips to help prevent hemorrhoids    Eat more fiber. Fiber adds bulk to stool and absorbs water as it moves through your colon. This makes stool softer and easier to pass.  ? Increase the fiber in your diet with more fiber-rich foods. These include fresh fruit, vegetables, and whole grains.  ? Take a fiber supplement or bulking agent, if advised by your healthcare provider. These include products such as psyllium or methylcellulose.    Drink more water. Your healthcare provider may direct you to drink plenty of water. This can help keep stool soft.    Be more active. Frequent exercise aids digestion and helps prevent constipation. It may also help make bowel movements more regular.    Don t strain during bowel movements. This can make hemorrhoids more likely. Also, don t sit on the toilet for long periods of time.  Follow-up care  Follow up with your healthcare provider as advised. If a culture or imaging tests were done, someone will let you know the results when they are ready. This may take a few days or longer. If your healthcare provider recommends a procedure for your hemorrhoids, these options can be discussed. Options may include surgery and outpatient office treatments.  When to seek medical advice  Call your healthcare provider right away if any of these occur:    Increased bleeding from the rectum    Increased pain around the rectum or anus    Weakness or dizziness  Call 911  Call 911 if any of these occur:    Trouble breathing or swallowing    Fainting or loss of consciousness    Unusually fast heart rate    Vomiting blood    Large amounts of blood in stool or black, tarry stools  Date Last Reviewed: 9/1/2017     6225-1470 [employer]. [address] [place] [address], [place] [address]. All rights reserved. This information is not intended as a substitute for professional medical care. Always follow your healthcare professional's instructions.        Treating Hemorrhoids: Self-Care    Follow your healthcare provider s advice about caring for your hemorrhoids at home. Some treatments help relieve symptoms right away. Others involve making changes in your diet and exercise habits. These can help ease constipation and prevent hemorrhoid symptoms from coming back.  Relieving symptoms  Your healthcare provider may prescribe anti-inflammatory medicine to help ease your symptoms. The following tips will also help relieve pain and swelling.    Take sitz baths. Taking a sitz bath means sitting in a few inches of warm bath water. Soaking for 10 minutes twice a day can provide welcome relief from painful hemorrhoids. It can also help the area stay clean.    Develop good bowel habits. Use the bathroom when you need to. Don t ignore the urge to move your bowels. This can lead to constipation, hard stools, and straining. Also, don t read while on the toilet. Sit only as long as needed. Wipe gently with soft, unscented toilet tissue or baby wipes.    Use ice packs. Placing an ice pack on a thrombosed external hemorrhoid can help relieve pain right away. It will also help reduce the blood clot. Use the ice for 15 to 20 minutes at a time. Keep a cloth between the ice and your skin to prevent skin damage.    Use other measures. Laxatives and enemas can help ease constipation. But use them only on your healthcare provider s advice. For symptom relief, try using cotton pads soaked in witch hazel. These are available at most drugstores. Over-the-counter hemorrhoid ointments and petroleum jelly can also provide relief.  Add fiber to your diet  Adding fiber to your diet can help relieve constipation by making stools softer and easier to pass.  To increase your fiber intake, your healthcare provider may recommend a bulking agent, such as psyllium. This is a high-fiber supplement available at most grocery stores and drugstores. Eating more fiber-rich foods will also help. There are two types of fiber:    Insoluble fiber is the main ingredient in bulking agents. It s also found in foods such as wheat bran, whole-grain breads, fresh fruits, and vegetables.    Soluble fiber is found in foods such as oat bran. Although soluble fiber is good for you, it may not ease constipation as much as foods high in insoluble fiber.  Drink more water  Along with a high-fiber diet, drinking more water can help ease constipation. This is because insoluble fiber absorbs water, making stools soft and bulky. Be sure to drink plenty of water throughout the day. Drinking fruit juices, such as prune juice or apple juice, can also help prevent constipation.  Get more exercise  Regular exercise aids digestion and helps prevent constipation. It s also great for your health. So talk with your healthcare provider about starting an exercise program. Low-impact activities, such as swimming or walking, are good places to start. Take it easy at first. And remember to drink plenty of water when you exercise.  High-fiber foods  High-fiber foods offer many benefits. By making your stools softer, they help heal and prevent swollen hemorrhoids. They may also help reduce the risk of colon and rectal cancer. Best of all, they re usually low in calories and taste great. Here are some examples of fiber-rich foods.    Whole grains, such as wheat bran, corn bran, and brown rice.    Vegetables, especially carrots, broccoli, cabbage, and peas.    Fruits, such as apples, bananas, raisins, peaches, and pears.    Nuts and legumes, especially peanuts, lentils, and kidney beans.  Easy ways to add fiber  The tips below offer some simple ways to add more high-fiber foods to your meals.    Start your day with a  high-fiber breakfast. Eat a wheat bran cereal along with a sliced banana. Or, try peanut butter on whole-wheat toast.    Eat carrot sticks for snacks. They re easy to prepare, taste great, and are low in calories.    Use whole-grain breads instead of white bread for sandwiches.    Eat fruits for treats. Try an apple and some raisins instead of a candy bar.   Date Last Reviewed: 7/1/2016 2000-2017 [employer]. [address] [place] [address]. All rights reserved. This information is not intended as a substitute for professional medical care. Always follow your healthcare professional's instructions.

## 2018-07-06 NOTE — TELEPHONE ENCOUNTER
"Noelle called back, she says only specialty provider can refer directly to them for PT.   Family Practice needs to send referral for patient to see provider at the [facility] first (eval and treat), she thinks it might be worded something like \"neurology consult or neurology referral\" to [facility].    Please re-order and re-fax.    Routed to [facility] to address.  Sofie Nguyen RN  [facility]      "

## 2018-07-06 NOTE — PROGRESS NOTES
SUBJECTIVE:   Mukesh Wolff is a 32 year old male who presents to clinic today for the following health issues:    Dizziness  Onset: on/off x 1.5yrs    Description:   Do you feel faint:  no   Does it feel like the surroundings (bed, room) are moving: YES  Unsteady/off balance: YES  Have you passed out or fallen: no     Intensity: moderate    Progression of Symptoms:  Improving - Little    Accompanying Signs & Symptoms:  Heart palpitations: no   Nausea, vomiting: YES- Nauseated  Weakness in arms or legs: no   Fatigue: YES- Little  Vision or speech changes: YES- Vision  Ringing in ears (Tinnitus): no   Hearing Loss: no     History:   Head trauma/concussion hx: YES- Minor Concussion when he was 15 or 16 yrs of age  Previous similar symptoms: no   Recent bleeding history: no     Precipitating factors:   Worse with activity or head movement: YES  Any new medications (BP?): no   Alcohol/drug abuse/withdrawal: no     Alleviating factors:   Does staying in a fixed position give relief:  no     Therapies Tried and outcome: Nothing    He has a history of PKU and follows with Dr. Bryant    Feels like things are spinning, nausea (no vomiting)  Does not necessarily occur with position changes. Can occur when sitting or walking  Does not notice pattern with what causes it  At its worse, will occur a few times a week  This past month has occurred 3 times  Can last from 30 minutes to a few hours  Denies lightheadedness  Denies tinnitus, hearing changes  More often occurs in the morning  Relieved by: unsure  Family history of diabetes  Denies chest pain, palpitations      He was seen at [facility] recently (records to be scanned into chart)  Dx with seronegative juvenile polyarthritis  Trial of Hydroxychloroquine and Piroxicam which he eventually stopped  He is now treating with ibuprofen     He is a vegetarian  Thinks he has hemorrhoids  Denies black, bloody stools  Sometimes has streaks of blood  on toilet paper  Does have some constipation      Problem list and histories reviewed & adjusted, as indicated.  Additional history: as documented    Patient Active Problem List   Diagnosis     PKU (phenylketonuria) (H)     Kidney stones     Juvenile seronegative polyarthritis (H)     History reviewed. No pertinent surgical history.    Social History   Substance Use Topics     Smoking status: Former Smoker     Types: Cigarettes     Smokeless tobacco: Former User     Types: Chew     Alcohol use Yes     Family History   Problem Relation Age of Onset     Rheumatoid Arthritis Mother      Diabetes Paternal Uncle      Rheumatoid Arthritis Maternal Grandmother          Current Outpatient Prescriptions   Medication Sig Dispense Refill     Nutritional Supplements (GLYTACTIN RTD 15) LIQD Take 4 Packages by mouth daily 47105 mL 12     Sapropterin Dihydrochloride 100 MG PACK Take 100 mg by mouth daily with food (Take 1 of 100mg Kuvan powder packet and 4 of 500mg Kuvan powder packet with total of 2100mg per day. Take with food) 30 each 11     Sapropterin Dihydrochloride 500 MG PACK Take 2,000 mg by mouth daily with food (Take 1 of 100mg Kuvan powder packet and 4 of 500mg Kuvan powder packet with total of 2100mg per day. Take with food) 120 each 11     [DISCONTINUED] sapropterin dihydrochloride (KUVAN) 100 MG TBSO Take 2,100 mg by mouth daily .Take 21 tablets by mouth once daily with a meal. May crush tablets and mix with food or applesauce. 630 tablet 3     BP Readings from Last 3 Encounters:   07/06/18 125/84   03/12/18 125/86   05/12/16 147/87    Wt Readings from Last 3 Encounters:   07/06/18 230 lb 4 oz (104.4 kg)   03/12/18 240 lb 15.4 oz (109.3 kg)   05/12/16 231 lb 7.7 oz (105 kg)                    Reviewed and updated as needed this visit by clinical staff       Reviewed and updated as needed this visit by Provider         ROS:   ROS: 10 point ROS neg other than the symptoms noted above in the HPI.      OBJECTIVE:     BP  125/84 (BP Location: Right arm, Patient Position: Sitting, Cuff Size: Adult Large)  Pulse 80  Temp 97.6  F (36.4  C) (Oral)  Wt 230 lb 4 oz (104.4 kg)  SpO2 97%  BMI 29.87 kg/m2  Body mass index is 29.87 kg/(m^2).  GENERAL: healthy, alert and no distress  EYES: Eyes grossly normal to inspection, PERRL and conjunctivae and sclerae normal  HENT: normal cephalic/atraumatic, right ear: occluded with wax, left ear: normal: no effusions, no erythema, normal landmarks, nose and mouth without ulcers or lesions, oropharynx clear and oral mucous membranes moist  NECK: no adenopathy, no asymmetry, masses, or scars and thyroid normal to palpation  RESP: lungs clear to auscultation - no rales, rhonchi or wheezes  CV: regular rate and rhythm, normal S1 S2, no S3 or S4, no murmur, click or rub, no peripheral edema and peripheral pulses strong  ABDOMEN: soft, nontender, no hepatosplenomegaly, no masses and bowel sounds normal  MS: no gross musculoskeletal defects noted, no edema  SKIN: no suspicious lesions or rashes  NEURO: Normal strength and tone, mentation intact and speech normal  PSYCH: mentation appears normal, affect normal/bright    I used curette to gentle remove cerumen from ear canal. (Alligator forceps not available) I was able to remove a moderate amount of dark, thick wax. Afterwards, ear was lavaged by MA. Again, I used currette to lift away from ear canal. With additional lavage, a large amount of cerumen was removed. Afterwards able to visualize TM which appeared normal. Patient tolerated procedure    Diagnostic Test Results:  ECG: SR (no previous ECG for comparison)    ASSESSMENT/PLAN:       ICD-10-CM    1. Dizziness R42 Comprehensive metabolic panel (BMP + Alb, Alk Phos, ALT, AST, Total. Bili, TP)     CBC with platelets     TSH with free T4 reflex     Vitamin B12     Hemoglobin A1c     EKG 12-lead, tracing only     PHYSICAL THERAPY REFERRAL   2. Juvenile seronegative polyarthritis (H) M08.3    3. PKU  (phenylketonuria) (H) E70.0    4. Impacted cerumen of right ear H61.21 REMOVE IMPACTED CERUMEN     Physical exam unremarkable. ECG normal. I suspect vestibular cause. Lab work to rule out other etiologies but ultimately recommend vestibular rehab. Will have our TC fax referral to [facility].  Discussed importance of preventing constipation in treating hemorrhoids. Will add metamucil. Can you topical steroid, witch hazel PRN  See Patient Instructions    More than 25 minutes spent with patient, more than 50% of which was spent on counseling and coordination of care.    MARIELLE Rehman CNP  [facility]

## 2018-07-06 NOTE — MR AVS SNAPSHOT
After Visit Summary   7/6/2018    Mukesh Wolff    MRN: [id number]           Patient Information     Date Of Birth          1985        Visit Information        Provider Department      7/6/2018 7:20 AM Coleen Card APRN [facility]        Today's Diagnoses     Dizziness    -  1    Juvenile seronegative polyarthritis (H)        PKU (phenylketonuria) (H)          Care Instructions      I recommend taking 1 spoonful metamucil daily with a large glass of water to help prevent constipation  Use over the counter hydrocortisone on hemorrhoids 2-3x/day (generic is fine)  You can wipe with Tucks or wipes with witch hazel  I referred you to the [facility]  Their closest clinic is [place]. You can call [phone] to schedule. We will fax a referral there    Treating Constipation    Constipation is a common and often uncomfortable problem. Constipation means you have bowel movements fewer than 3 times per week, or strain to pass hard, dry stool. It can last a short time. Or it can be a problem that never seems to go away. The good news is that it can often be treated and controlled.  Eat more fiber  One of the best ways to help treat constipation is to increase your fiber intake. You can do this either through diet or by using fiber supplements. Fiber (in whole grains, fruits, and vegetables) adds bulk and absorbs water to soften the stool. This helps the stool pass through the colon more easily. When you increase your fiber intake, do it slowly to avoid side effects such as bloating. Also increase the amount of water that you drink. Eating more of the following foods can add fiber to your diet.    High-fiber cereals    Whole grains, bran, and brown rice    Vegetables such as carrots, broccoli, and greens    Fresh fruits (especially apples, pears, and dried fruits like raisins and apricots)    Nuts and legumes (especially beans such as lentils,  kidney beans, and lima beans)  Get physically active  Exercise helps improve the working of your colon which helps ease constipation. Try to get some physical activity every day. If you haven t been active for a while, talk to your healthcare provider before starting again.  Laxatives  Your healthcare provider may suggest an over-the-counter product to help ease your constipation. He or she may suggest the use of bulk-forming agents or laxatives. The use of laxatives, if used as directed, is common and safe. Follow directions carefully when using them. See your healthcare provider for new-onset constipation, or long-term constipation, to rule out other causes such as medicines or thyroid disease.  Date Last Reviewed: 7/1/2016 2000-2017 [employer]. [address] [place] [address], [place] [address]. All rights reserved. This information is not intended as a substitute for professional medical care. Always follow your healthcare professional's instructions.        Constipation (Adult)  Constipation means that you have bowel movements that are less frequent than usual. Stools often become very hard and difficult to pass.  Constipation is very common. At some point in life it affects almost everyone. Since everyone's bowel habits are different, what is constipation to one person may not be to another. Your healthcare provider may do tests to diagnose constipation. It depends on what he or she finds when evaluating you.    Symptoms of constipation include:    Abdominal pain    Bloating    Vomiting    Painful bowel movements    Itching, swelling, bleeding, or pain around the anus  Causes  Constipation can have many causes. These include:    Diet low in fiber    Too much dairy    Not drinking enough liquids    Lack of exercise or physical activity. This is especially true for older adults.    Changes in lifestyle or daily routine, including pregnancy, aging, work, and travel    Frequent use or misuse of  laxatives    Ignoring the urge to have a bowel movement or delaying it until later    Medicines, such as certain prescription pain medicines, iron supplements, antacids, certain antidepressants, and calcium supplements    Diseases like irritable bowel syndrome, bowel obstructions, stroke, diabetes, thyroid disease, Parkinson disease, hemorrhoids, and colon cancer  Complications  Potential complications of constipation can include:    Hemorrhoids    Rectal bleeding from hemorrhoids or anal fissures (skin tears)    Hernias    Dependency on laxatives    Chronic constipation    Fecal impaction    Bowel obstruction or perforation  Home care  All treatment should be done after talking with your healthcare provider. This is especially true if you have another medical problems, are taking prescription medicines, or are an older adult. Treatment most often involves lifestyle changes. You may also need medicines. Your healthcare provider will tell you which will work best for you. Follow the advice below to help avoid this problem in the future.  Lifestyle changes  These lifestyle changes can help prevent constipation:    Diet. Eat a high-fiber diet, with fresh fruit and vegetables, and reduce dairy intake, meats, and processed foods    Fluids. It's important to get enough fluids each day. Drink plenty of water when you eat more fiber. If you are on diet that limits the amount of fluid you can have, talk about this with your healthcare provider.    Regular exercise. Check with your healthcare provider first.  Medicines  Take any medicines as directed. Some laxatives are safe to use only every now and then. Others can be taken on a regular basis. Talk with your doctor or pharmacist if you have questions.  Prescription pain medicines can cause constipation. If you are taking this kind of medicine, ask your healthcare provider if you should also take a stool softener.  Medicines you may take to treat constipation  include:    Fiber supplements    Stool softeners    Laxatives    Enemas    Rectal suppositories  Follow-up care  Follow up with your healthcare provider if symptoms don't get better in the next few days. You may need to have more tests or see a specialist.  Call 911  Call 911 if any of these occur:    Trouble breathing    Stiff, rigid abdomen that is severely painful to touch    Confusion    Fainting or loss of consciousness    Rapid heart rate    Chest pain  When to seek medical advice  Call your healthcare provider right away if any of these occur:    Fever of 100.4 F (38 C) or higher, or as directed by your healthcare provider    Failure to resume normal bowel movements    Pain in your abdomen or back gets worse    Nausea or vomiting    Swelling in your abdomen    Blood in the stool    Black, tarry stool    Involuntary weight loss    Weakness  Date Last Reviewed: 12/30/2015 2000-2017 [employer]. [address] [place] [address]. All rights reserved. This information is not intended as a substitute for professional medical care. Always follow your healthcare professional's instructions.        Hemorrhoids    Hemorrhoids are swollen and inflamed veins inside the rectum and near the anus. The rectum is the last several inches of the colon. The anus is the passage between the rectum and the outside of the body.  Causes  The veins can become swollen due to increased pressure in them. This is most often caused by:    Chronic constipation or diarrhea    Straining when having a bowel movement    Sitting too long on the toilet    A low-fiber diet    Pregnancy  Symptoms    Bleeding from the rectum (this may be noticeable after bowel movements)    Lump near the anus    Itching around the anus    Pain around the anus  There are different types of hemorrhoids. Depending on the type you have and the severity, you may be able to treat yourself at home. In some cases, a procedure may be the best  treatment option. Your healthcare provider can tell you more about this, if needed.  Home care  General care    To get relief from pain or itching, try:  ? Medicines. Your healthcare provider may recommend stool softeners, suppositories, or laxatives to help manage constipation. Use these exactly as directed.  ? Sitz baths. A sitz bath involves sitting in a few inches of warm bath water. Be careful not to make the water so hot that you burn yourself--test it before sitting in it. Soak for about 10 to 15 minutes a few times a day. This may help relieve pain.  ? Topical products. Your healthcare provider may prescribe or recommend creams, ointments, or pads that can be applied to the hemorrhoid. Use these exactly as directed.  Tips to help prevent hemorrhoids    Eat more fiber. Fiber adds bulk to stool and absorbs water as it moves through your colon. This makes stool softer and easier to pass.  ? Increase the fiber in your diet with more fiber-rich foods. These include fresh fruit, vegetables, and whole grains.  ? Take a fiber supplement or bulking agent, if advised by your healthcare provider. These include products such as psyllium or methylcellulose.    Drink more water. Your healthcare provider may direct you to drink plenty of water. This can help keep stool soft.    Be more active. Frequent exercise aids digestion and helps prevent constipation. It may also help make bowel movements more regular.    Don t strain during bowel movements. This can make hemorrhoids more likely. Also, don t sit on the toilet for long periods of time.  Follow-up care  Follow up with your healthcare provider as advised. If a culture or imaging tests were done, someone will let you know the results when they are ready. This may take a few days or longer. If your healthcare provider recommends a procedure for your hemorrhoids, these options can be discussed. Options may include surgery and outpatient office treatments.  When to seek  medical advice  Call your healthcare provider right away if any of these occur:    Increased bleeding from the rectum    Increased pain around the rectum or anus    Weakness or dizziness  Call 911  Call 911 if any of these occur:    Trouble breathing or swallowing    Fainting or loss of consciousness    Unusually fast heart rate    Vomiting blood    Large amounts of blood in stool or black, tarry stools  Date Last Reviewed: 9/1/2017 2000-2017 [employer]. [address] [place] [address], [place] [id number]. All rights reserved. This information is not intended as a substitute for professional medical care. Always follow your healthcare professional's instructions.        Treating Hemorrhoids: Self-Care    Follow your healthcare provider s advice about caring for your hemorrhoids at home. Some treatments help relieve symptoms right away. Others involve making changes in your diet and exercise habits. These can help ease constipation and prevent hemorrhoid symptoms from coming back.  Relieving symptoms  Your healthcare provider may prescribe anti-inflammatory medicine to help ease your symptoms. The following tips will also help relieve pain and swelling.    Take sitz baths. Taking a sitz bath means sitting in a few inches of warm bath water. Soaking for 10 minutes twice a day can provide welcome relief from painful hemorrhoids. It can also help the area stay clean.    Develop good bowel habits. Use the bathroom when you need to. Don t ignore the urge to move your bowels. This can lead to constipation, hard stools, and straining. Also, don t read while on the toilet. Sit only as long as needed. Wipe gently with soft, unscented toilet tissue or baby wipes.    Use ice packs. Placing an ice pack on a thrombosed external hemorrhoid can help relieve pain right away. It will also help reduce the blood clot. Use the ice for 15 to 20 minutes at a time. Keep a cloth between the ice and your skin to prevent skin  damage.    Use other measures. Laxatives and enemas can help ease constipation. But use them only on your healthcare provider s advice. For symptom relief, try using cotton pads soaked in witch hazel. These are available at most drugstores. Over-the-counter hemorrhoid ointments and petroleum jelly can also provide relief.  Add fiber to your diet  Adding fiber to your diet can help relieve constipation by making stools softer and easier to pass. To increase your fiber intake, your healthcare provider may recommend a bulking agent, such as psyllium. This is a high-fiber supplement available at most grocery stores and drugstores. Eating more fiber-rich foods will also help. There are two types of fiber:    Insoluble fiber is the main ingredient in bulking agents. It s also found in foods such as wheat bran, whole-grain breads, fresh fruits, and vegetables.    Soluble fiber is found in foods such as oat bran. Although soluble fiber is good for you, it may not ease constipation as much as foods high in insoluble fiber.  Drink more water  Along with a high-fiber diet, drinking more water can help ease constipation. This is because insoluble fiber absorbs water, making stools soft and bulky. Be sure to drink plenty of water throughout the day. Drinking fruit juices, such as prune juice or apple juice, can also help prevent constipation.  Get more exercise  Regular exercise aids digestion and helps prevent constipation. It s also great for your health. So talk with your healthcare provider about starting an exercise program. Low-impact activities, such as swimming or walking, are good places to start. Take it easy at first. And remember to drink plenty of water when you exercise.  High-fiber foods  High-fiber foods offer many benefits. By making your stools softer, they help heal and prevent swollen hemorrhoids. They may also help reduce the risk of colon and rectal cancer. Best of all, they re usually low in calories and  "taste great. Here are some examples of fiber-rich foods.    Whole grains, such as wheat bran, corn bran, and brown rice.    Vegetables, especially carrots, broccoli, cabbage, and peas.    Fruits, such as apples, bananas, raisins, peaches, and pears.    Nuts and legumes, especially peanuts, lentils, and kidney beans.  Easy ways to add fiber  The tips below offer some simple ways to add more high-fiber foods to your meals.    Start your day with a high-fiber breakfast. Eat a wheat bran cereal along with a sliced banana. Or, try peanut butter on whole-wheat toast.    Eat carrot sticks for snacks. They re easy to prepare, taste great, and are low in calories.    Use whole-grain breads instead of white bread for sandwiches.    Eat fruits for treats. Try an apple and some raisins instead of a candy bar.   Date Last Reviewed: 7/1/2016 2000-2017 [employer]. [address] [place] [address]. All rights reserved. This information is not intended as a substitute for professional medical care. Always follow your healthcare professional's instructions.                Follow-ups after your visit        Additional Services     PHYSICAL THERAPY REFERRAL       Referral to [facility]    Treatment: Evaluation & Treatment  Special Instructions/Modalities: vestibular rehab at [facility] for 1-2 year history of intermittent dizziness  Special Programs: Balance/Vestibular    Please be aware that coverage of these services is subject to the terms and limitations of your health insurance plan.  Call member services at your health plan with any benefit or coverage questions.      **Note to Provider:  If you are referring outside of [facility] for the therapy appointment, please list the name of the location in the \"special instructions\" above, print the referral and give to the patient to schedule the appointment.                  Your next 10 appointments already " scheduled     Mar 11, 2019  2:30 PM CDT   Return Visit with MD Lisa Pro ([facility])    [facility]  [address]  [address] [facility] [address] [phone]              Who to contact     If you have questions or need follow up information about today's clinic visit or your schedule please contact [facility] directly at [phone].  Normal or non-critical lab and imaging results will be communicated to you by [email or web address], letter or phone within 4 business days after the clinic has received the results. If you do not hear from us within 7 days, please contact the clinic through [email or web address] or phone. If you have a critical or abnormal lab result, we will notify you by phone as soon as possible.  Submit refill requests through [email or web address] or call your pharmacy and they will forward the refill request to us. Please allow 3 business days for your refill to be completed.          Additional Information About Your Visit        App[employer] Information     [employer] gives you secure access to your electronic health record. If you see a primary care provider, you can also send messages to your care team and make appointments. If you have questions, please call your primary care clinic.  If you do not have a primary care provider, please call [phone] and they will assist you.        Care EveryWhere ID     This is your Care EveryWhere ID. This could be used by other organizations to access your [place] medical records  [id number]        Your Vitals Were     Pulse Temperature Pulse Oximetry BMI (Body Mass Index)          80 97.6  F (36.4  C) (Oral) 97% 29.87 kg/m2         Blood Pressure from Last 3 Encounters:   07/06/18 125/84   03/12/18 125/86   05/12/16 147/87    Weight from Last 3 Encounters:   07/06/18 230 lb 4 oz (104.4 kg)   03/12/18 240 lb 15.4 oz (109.3 kg)   05/12/16 231 lb 7.7 oz (105 kg)              We Performed the Following     CBC with platelets      Comprehensive metabolic panel (BMP + Alb, Alk Phos, ALT, AST, Total. Bili, TP)     EKG 12-lead, tracing only     Hemoglobin A1c     PHYSICAL THERAPY REFERRAL     TSH with free T4 reflex     Vitamin B12          Today's Medication Changes          These changes are accurate as of 7/6/18  8:37 AM.  If you have any questions, ask your nurse or doctor.               These medicines have changed or have updated prescriptions.        Dose/Directions    * Sapropterin Dihydrochloride 100 MG Pack   This may have changed:  Another medication with the same name was removed. Continue taking this medication, and follow the directions you see here.   Used for:  Phenylketonuria (PKU) (H)   Changed by:  Coleen Card APRN CNP        Dose:  100 mg   Take 100 mg by mouth daily with food (Take 1 of 100mg Kuvan powder packet and 4 of 500mg Kuvan powder packet with total of 2100mg per day. Take with food)   Quantity:  30 each   Refills:  11       * Sapropterin Dihydrochloride 500 MG Pack   This may have changed:  Another medication with the same name was removed. Continue taking this medication, and follow the directions you see here.   Used for:  Phenylketonuria (PKU) (H)   Changed by:  Coleen Card APRN CNP        Dose:  2000 mg   Take 2,000 mg by mouth daily with food (Take 1 of 100mg Kuvan powder packet and 4 of 500mg Kuvan powder packet with total of 2100mg per day. Take with food)   Quantity:  120 each   Refills:  11       * Notice:  This list has 2 medication(s) that are the same as other medications prescribed for you. Read the directions carefully, and ask your doctor or other care provider to review them with you.             Primary Care Provider Fax #    Physician No Ref-Primary [phone]       No address on file        Equal Access to Services     AMADO VILLA : Ralph Jones, shwetha winters, lachelle buchanan. So [facility]  [phone].    ATENCIÓN: Si teja gutierrez, tiene a mei disposición servicios gratuitos de asistencia lingüística. Neena persaud [phone].    We comply with applicable federal civil rights laws and [place] laws. We do not discriminate on the basis of race, color, national origin, age, disability, sex, sexual orientation, or gender identity.            Thank you!     Thank you for choosing [facility]  for your care. Our goal is always to provide you with excellent care. Hearing back from our patients is one way we can continue to improve our services. Please take a few minutes to complete the written survey that you may receive in the mail after your visit with us. Thank you!             Your Updated Medication List - Protect others around you: Learn how to safely use, store and throw away your medicines at www.disposemymeds.org.          This list is accurate as of 7/6/18  8:37 AM.  Always use your most recent med list.                   Brand Name Dispense Instructions for use Diagnosis    GLYTACTIN RTD 15 Liqd     43034 mL    Take 4 Packages by mouth daily    PKU (phenylketonuria) (H)       * Sapropterin Dihydrochloride 100 MG Pack     30 each    Take 100 mg by mouth daily with food (Take 1 of 100mg Kuvan powder packet and 4 of 500mg Kuvan powder packet with total of 2100mg per day. Take with food)    Phenylketonuria (PKU) (H)       * Sapropterin Dihydrochloride 500 MG Pack     120 each    Take 2,000 mg by mouth daily with food (Take 1 of 100mg Kuvan powder packet and 4 of 500mg Kuvan powder packet with total of 2100mg per day. Take with food)    Phenylketonuria (PKU) (H)       * Notice:  This list has 2 medication(s) that are the same as other medications prescribed for you. Read the directions carefully, and ask your doctor or other care provider to review them with you.

## 2018-07-09 NOTE — PROGRESS NOTES
Mukesh Wolff,    Your lab results have been released to [facility].   Your labs look good. No abnormalities to suggest a cause for your symptoms.   I put in a referral for you to be seen by neurology at the [facility]. They should call you to schedule an appointment.   Please call the clinic if you have any concerns [phone].    MARIELLE Rehman [facility]

## 2018-07-17 ENCOUNTER — TRANSFERRED RECORDS (OUTPATIENT)
Dept: HEALTH INFORMATION MANAGEMENT | Facility: CLINIC | Age: 33
End: 2018-07-17

## 2018-07-20 ENCOUNTER — TELEPHONE (OUTPATIENT)
Dept: PEDIATRICS | Facility: CLINIC | Age: 33
End: 2018-07-20

## 2018-07-20 NOTE — TELEPHONE ENCOUNTER
PA Initiation    Medication: Kuvan- Initiated  Insurance Company: [employer] - Phone [phone] Fax [phone]  Pharmacy Filling the Rx: [facility] - [address] [place] [address]  Filling Pharmacy Phone:    Filling Pharmacy Fax:    Start Date: 7/20/2018

## 2018-07-20 NOTE — TELEPHONE ENCOUNTER
Prior Authorization Approval    Authorization Effective Date: 6/20/2018  Authorization Expiration Date: 7/20/2019  Medication: Kuvan- Approved  Approved Dose/Quantity: 500mg and 100mg/ 2100mg daily  Reference #: case [id number]   Insurance Company: [employer] - Phone [phone] Fax [phone]  Expected CoPay:       CoPay Card Available:      Foundation Assistance Needed:    Which Pharmacy is filling the prescription (Not needed for infusion/clinic administered): [facility] - [address] [place] [address]  Pharmacy Notified:    Patient Notified:

## 2018-07-31 ENCOUNTER — TRANSFERRED RECORDS (OUTPATIENT)
Dept: HEALTH INFORMATION MANAGEMENT | Facility: CLINIC | Age: 33
End: 2018-07-31

## 2018-08-07 ENCOUNTER — TELEPHONE (OUTPATIENT)
Dept: FAMILY MEDICINE | Facility: CLINIC | Age: 33
End: 2018-08-07

## 2018-08-07 DIAGNOSIS — G43.809 VESTIBULAR MIGRAINE: Primary | ICD-10-CM

## 2018-08-07 NOTE — TELEPHONE ENCOUNTER
I spoke with patient  Dx with vestibular migraine  Started vestibular therapy yesterday  Advised to start CoQ10, Riboflavin and Magnesium  I discussed this with him  He will buy over the counter   Doses recommenced by neurology  Advised to look for [facility] label

## 2018-08-07 NOTE — TELEPHONE ENCOUNTER
Reason for Call:  Other - Patient Question    Detailed comments: Patient called and stated he went to the [facility] in [place]. They wanted him to discuss taking an additional supplement for his condition with Coleen Card. They would like him to take magnesium, COQ10, and riboflavin. Patient asked if it is possible to have Coleen Card call him to discuss this over the phone. Please call back.    Phone Number Patient can be reached at: Home number on file [phone] (home)    Best Time: Anytime    Can we leave a detailed message on this number? YES    Call taken on 8/7/2018 at 8:21 AM by Lorrie Lakhani

## 2018-11-29 NOTE — TELEPHONE ENCOUNTER
I printed and faxed referral to [phone].    Sofie Nguyen RN  [facility]       Post-Care Instructions: I reviewed with the patient in detail post-care instructions. The patient understands that the treated areas should be washed off 2 hours after application.  See attached copy of post care instructions. Strength: Felix Curette Before Application?: Yes Detail Level: Detailed Consent: The patient's consent was obtained including but not limited to risks of crusting, scabbing, scarring, blistering, darker or lighter pigmentary change, recurrence, incomplete removal and infection. Include Z78.9 (Other Specified Conditions Influencing Health Status) As An Associated Diagnosis?: No Medical Necessity Information: It is in your best interest to select a reason for this procedure from the list below. All of these items fulfill various CMS LCD requirements except the new and changing color options. Medical Necessity Clause: This procedure was medically necessary because the lesions that were treated were:

## 2018-11-30 ENCOUNTER — TRANSFERRED RECORDS (OUTPATIENT)
Dept: HEALTH INFORMATION MANAGEMENT | Facility: CLINIC | Age: 33
End: 2018-11-30

## 2019-03-11 ENCOUNTER — OFFICE VISIT (OUTPATIENT)
Dept: PEDIATRICS | Facility: CLINIC | Age: 34
End: 2019-03-11
Attending: PEDIATRICS
Payer: COMMERCIAL

## 2019-03-11 ENCOUNTER — MEDICAL CORRESPONDENCE (OUTPATIENT)
Dept: HEALTH INFORMATION MANAGEMENT | Facility: CLINIC | Age: 34
End: 2019-03-11

## 2019-03-11 VITALS
HEART RATE: 84 BPM | DIASTOLIC BLOOD PRESSURE: 76 MMHG | BODY MASS INDEX: 31.18 KG/M2 | WEIGHT: 242.95 LBS | HEIGHT: 74 IN | SYSTOLIC BLOOD PRESSURE: 117 MMHG

## 2019-03-11 DIAGNOSIS — E70.1 PHENYLKETONURIA (PKU) (H): Primary | ICD-10-CM

## 2019-03-11 LAB
ALBUMIN SERPL-MCNC: 4.3 G/DL (ref 3.4–5)
ALP SERPL-CCNC: 62 U/L (ref 40–150)
ALT SERPL W P-5'-P-CCNC: 31 U/L (ref 0–70)
ANION GAP SERPL CALCULATED.3IONS-SCNC: 5 MMOL/L (ref 3–14)
AST SERPL W P-5'-P-CCNC: 18 U/L (ref 0–45)
BASOPHILS # BLD AUTO: 0.1 10E9/L (ref 0–0.2)
BASOPHILS NFR BLD AUTO: 1.2 %
BILIRUB SERPL-MCNC: 0.4 MG/DL (ref 0.2–1.3)
BUN SERPL-MCNC: 9 MG/DL (ref 7–30)
CALCIUM SERPL-MCNC: 8.9 MG/DL (ref 8.5–10.1)
CHLORIDE SERPL-SCNC: 108 MMOL/L (ref 94–109)
CO2 SERPL-SCNC: 27 MMOL/L (ref 20–32)
CREAT SERPL-MCNC: 0.79 MG/DL (ref 0.66–1.25)
DIFFERENTIAL METHOD BLD: NORMAL
EOSINOPHIL # BLD AUTO: 0.1 10E9/L (ref 0–0.7)
EOSINOPHIL NFR BLD AUTO: 2.6 %
ERYTHROCYTE [DISTWIDTH] IN BLOOD BY AUTOMATED COUNT: 12.3 % (ref 10–15)
GFR SERPL CREATININE-BSD FRML MDRD: >90 ML/MIN/{1.73_M2}
GLUCOSE SERPL-MCNC: 113 MG/DL (ref 70–99)
HCT VFR BLD AUTO: 43.7 % (ref 40–53)
HGB BLD-MCNC: 14.5 G/DL (ref 13.3–17.7)
IMM GRANULOCYTES # BLD: 0 10E9/L (ref 0–0.4)
IMM GRANULOCYTES NFR BLD: 0.2 %
LYMPHOCYTES # BLD AUTO: 1.9 10E9/L (ref 0.8–5.3)
LYMPHOCYTES NFR BLD AUTO: 37.5 %
MCH RBC QN AUTO: 30.9 PG (ref 26.5–33)
MCHC RBC AUTO-ENTMCNC: 33.2 G/DL (ref 31.5–36.5)
MCV RBC AUTO: 93 FL (ref 78–100)
MONOCYTES # BLD AUTO: 0.6 10E9/L (ref 0–1.3)
MONOCYTES NFR BLD AUTO: 10.9 %
NEUTROPHILS # BLD AUTO: 2.4 10E9/L (ref 1.6–8.3)
NEUTROPHILS NFR BLD AUTO: 47.6 %
NRBC # BLD AUTO: 0 10*3/UL
NRBC BLD AUTO-RTO: 0 /100
PLATELET # BLD AUTO: 284 10E9/L (ref 150–450)
POTASSIUM SERPL-SCNC: 4.1 MMOL/L (ref 3.4–5.3)
PROT SERPL-MCNC: 7.4 G/DL (ref 6.8–8.8)
RBC # BLD AUTO: 4.69 10E12/L (ref 4.4–5.9)
SODIUM SERPL-SCNC: 140 MMOL/L (ref 133–144)
WBC # BLD AUTO: 5 10E9/L (ref 4–11)

## 2019-03-11 PROCEDURE — 36415 COLL VENOUS BLD VENIPUNCTURE: CPT | Performed by: PEDIATRICS

## 2019-03-11 PROCEDURE — 85025 COMPLETE CBC W/AUTO DIFF WBC: CPT | Performed by: PEDIATRICS

## 2019-03-11 PROCEDURE — 84510 ASSAY OF TYROSINE: CPT | Performed by: PEDIATRICS

## 2019-03-11 PROCEDURE — G0463 HOSPITAL OUTPT CLINIC VISIT: HCPCS | Mod: ZF

## 2019-03-11 PROCEDURE — 80053 COMPREHEN METABOLIC PANEL: CPT | Performed by: PEDIATRICS

## 2019-03-11 ASSESSMENT — PAIN SCALES - GENERAL: PAINLEVEL: NO PAIN (0)

## 2019-03-11 ASSESSMENT — MIFFLIN-ST. JEOR: SCORE: 2110.13

## 2019-03-11 NOTE — LETTER
3/11/2019    RE: Mukesh Wolff  [address] [place] [address]               Advanced Therapies  [facility] [address]  [address] [facility] [address]  Phone: [phone]  Fax: [phone]  Date: 2019      Patient:  Mukesh Wolff   :   1985   MRN:     [id number]      Mukesh Wolff  [address] [facility] [address]    Dear Dr. Daniela Aviles and Mr.  Mukesh Wolff,    CHIEF COMPLAINT:     I had the pleasure of seeing Mukesh Wolff in the PKU and Maternal PKU Clinic at the [facility] regarding phenylketonuria (PKU). This patient is a 33 year old and comes for evaluation and treatment.  In addition to come in for his regular annual PKU clinic visit, Sachin has special interest in the new injectable medication for PKU called Palynziq  (pegvaliase-pqpz).  He is currently on oral medication Kuvan for PKU; however he has not been able to achieve low, therapeutic blood phenylalanine levels on the current treatment regimen of Kuvan in combination low free diet.    Since the last visit, there have been no hospitalizations, emergency department visits, or other major changes in medical care.    PAST MEDICAL HISTORY:    These list of past medical problems includes:    Patient Active Problem List   Diagnosis     PKU (phenylketonuria) (H)     Kidney stones     Juvenile seronegative polyarthritis (H)     FAMILY HISTORY: A brief family medical history was reviewed.  MEDICATIONS:   Current Outpatient Medications   Medication Sig     Nutritional Supplements (GLYTACTIN RTD 15) LIQD Take 4 Packages by mouth daily     Sapropterin Dihydrochloride 100 MG PACK Take 100 mg by mouth daily with food (Take 1 of 100mg Kuvan powder packet and 4 of 500mg Kuvan powder packet with total of 2100mg per day. Take with food)     Sapropterin Dihydrochloride 500 MG PACK Take 2,000 mg by mouth daily with food (Take 1 of 100mg Kuvan powder packet and 4 of 500mg Kuvan powder packet with total of 2100mg per  "day. Take with food)     No current facility-administered medications for this visit.      REVIEW OF SYSTEMS: The review of systems negative for new eye, ear, heart, lung, liver, spleen, gastrointestinal, bone, muscle, integumentary, endocrinologic, brain or psychiatric issues except as noted above.  PHYSICAL EXAMINATION:  Demographics: /76 (BP Location: Right arm, Patient Position: Sitting, Cuff Size: Adult Large)   Pulse 84   Ht 6' 1.9\" (187.7 cm)   Wt 242 lb 15.2 oz (110.2 kg)   BMI 31.28 kg/m     General: The patient is oriented to person, place and time at an age-appropriate manner.   HEENT: The facial features are normal and symmetric.   The gaze is conjugate and extraocular motions are full and intact.The pupils are equal, round and reactive to light.  The ears are of normal position and configuration and hearing is grossly normal.  The oropharynx is benign and the tongue protrudes normally without fasciculations.  Neck: The neck is supple with full range of motion  Chest: The chest is of normal configuration and clear by auscultation.   Heart: A normal S1 and S2 are heard without murmurs or gallops.  Abdomen: The abdomen is soft and benign without organomegaly.   Extremities: The extremities are of normal configuration without contractures nor hyperlaxities. Strength and tone appear to be normal and symmetric. Deep tendon reflexes are normal at the biceps, patellar and ankles, and there is no clonus at the ankles.   Integument: The integument is  of normal appearance without significant changes in pigmentation, birthmarks, or lesions.  Neurologic:  Mental Status Exam:  Alert, awake. Fully oriented. No dysarthria, no dysphasia. Speech of normal fluency.  Cranial Nerves:  PERRLA, EOMs intact, no nystagmus, facial movements symmetric. No atrophy or fasciculations.    Motor:  Normal tone in all four extremities, no atrophy or fasciculations. 5/5 strength bilaterally in shoulder abduction, elbow " "extensors and flexors, , hip flexors, knee extensors and flexors. No tremors.  Sensory:  Negative Romberg.  Reflexes:  2+ and symmetric in biceps, patellar, Achilles; There is no clonus at the ankles.  Gait:  Normal gait; normal arm swing and stance.ankles.    LABORATORY RESULTS: Previous studies showed pathologically elevated blood phenylalanine levels as well as specific PAH mutatons and excluded disorders of biopterin recycling. Laboratory studies from the past year were reviewed.    ASSESSMENT:  1.) Phenylketonuria (PKU).  2.) Current response to the only done okay combination of Kuvan and a low protein diet has not achieved the nationally recognized goal of blood phenylalanine levesl at 6 mg/dL or lower.  His most recent blood phenylalanine level was 11.8 mg/dl done at his last annual PKU Clinic 1 year ago, March 12, 2018.  We will repeat the blood phenylalanine level today while he continues on oral Kuvan with a low protein diet; he may be a good candidate for the new Palynziq  (pegvaliase-pqpz).    PLAN/RECOMMENDATIONS:    1.) Send blood \"tyrosine and phenylalanine\" levels monthly.  2.) Metabolic PKU Dietician Consult today for regular diet assessment and nutritional management including the patient's prescribed phenylalanine intake.  3.) Clinical Pharmacotherapy consultation with Pharmacotherapy for Inborn Errors of Metabolism (PIMD) expert regarding sapropterin dihydrochloride medication for PKU.  4.) Continue low phenylalanine diet (250-400 mg phenylalanine/day, or as modified by consultation with the Metabolic PKU Dietician considering recent blood phenylalanine levels, diet history, and impact of sapropterin, if taken).  5.) Return to PKU Clinic in 12 months, but sooner if he starts on the new medication Palynziq  (pegvaliase-pqpz) which requires frequent visits and much more frequent blood phenylalanine levels.   6).  Consultation with  today regarding Palynziq  (pegvaliase-pqpz)    FOLLOW-UP " PLAN:  If you are returning to clinic to review specific laboratory tests, please call the Genetic Counselor (see phone numbers below) to confirm that we have received all of the results from reference laboratories prior to your appointment. If we have not received all of the test results, please discuss re-scheduling your appointment.    I spent 40 minutes face-to-face with the patient reviewing the chief complaint, past medical history, and obtaining a review of systems as well as doing a physical examination; more than 50% of this time was spent in counseling and education regarding Maternal PKU, the availability of the local patient support group with information on the [place] PKU Foundation available at www.mnpku.org as well as the new medication Palynziq  (pegvaliase-pqpz), an injectable for PKU.    With warmest regards,     Augusto Bryant PhD MD  Professor of Pediatrics, and  [place] [facility] [employer]    Appointments: [phone]      Monday mornings: [facility]rapies for Lysosomal Diseases Clinic   Monday afternoons: PKU Clinic, Metabolism Clinic, and Genetics Clinic    Pharmacotherapy Consultant:  Daniela Aviles, PharmD, Pharmacotherapy for Metabolic Disorders (PIMD): [phone]  Leonardo Jones, PharmD, Pharmacotherapy for Metabolic Disorders (PIMD): [phone]    Genetic Counselor:  Kimberlee Renteria MS, [facility] (Genetic test Results): [phone]  Ginny Sanchez MS, [place], (Genetic test results: [phone])    Metabolic Dietician:  Julianna Villa, Registered Dietician: [phone]  Tiffanie Mccormack, Registered Dietician: [phone]    :  CORDELL Swift, [facility], Clinical , [phone]    Advanced Therapies Clinic Scheduler:  Jacki Cisneros, [phone]    Nurse Coordinator, Metabolism and Genetics (afternoon clinics):  SOLEDAD SandhuN, RN, PHN  [phone]        Copy to Referring Physician and Primary Care Practitioner:  Dr. Fleming [address]  [place] [address]    Copy  to patient:  Mukesh Wolff  [address] [address]

## 2019-03-11 NOTE — PROGRESS NOTES
Advanced Therapies  [facility] [address]  [address] [facility] [address]  Phone: [phone]  Fax: [phone]  Date: 2019      Patient:  Mukesh Wolff   :   1985   MRN:     [id number]      Mukesh Wolff  [address] [facility] [address]    Dear Dr. Daniela Aviles and Mr.  Mukesh Wolff,    CHIEF COMPLAINT:     I had the pleasure of seeing Mukesh Wolff in the PKU and Maternal PKU Clinic at the [facility] regarding phenylketonuria (PKU). This patient is a 33 year old and comes for evaluation and treatment.  In addition to come in for his regular annual PKU clinic visit, Sachin has special interest in the new injectable medication for PKU called Palynziq  (pegvaliase-pqpz).  He is currently on oral medication Kuvan for PKU; however he has not been able to achieve low, therapeutic blood phenylalanine levels on the current treatment regimen of Kuvan in combination low free diet.    Since the last visit, there have been no hospitalizations, emergency department visits, or other major changes in medical care.    PAST MEDICAL HISTORY:    These list of past medical problems includes:    Patient Active Problem List   Diagnosis     PKU (phenylketonuria) (H)     Kidney stones     Juvenile seronegative polyarthritis (H)     FAMILY HISTORY: A brief family medical history was reviewed.  MEDICATIONS:   Current Outpatient Medications   Medication Sig     Nutritional Supplements (GLYTACTIN RTD 15) LIQD Take 4 Packages by mouth daily     Sapropterin Dihydrochloride 100 MG PACK Take 100 mg by mouth daily with food (Take 1 of 100mg Kuvan powder packet and 4 of 500mg Kuvan powder packet with total of 2100mg per day. Take with food)     Sapropterin Dihydrochloride 500 MG PACK Take 2,000 mg by mouth daily with food (Take 1 of 100mg Kuvan powder packet and 4 of 500mg Kuvan powder packet with total of 2100mg per day. Take with food)     No current facility-administered medications for  "this visit.      REVIEW OF SYSTEMS: The review of systems negative for new eye, ear, heart, lung, liver, spleen, gastrointestinal, bone, muscle, integumentary, endocrinologic, brain or psychiatric issues except as noted above.  PHYSICAL EXAMINATION:  Demographics: /76 (BP Location: Right arm, Patient Position: Sitting, Cuff Size: Adult Large)   Pulse 84   Ht 6' 1.9\" (187.7 cm)   Wt 242 lb 15.2 oz (110.2 kg)   BMI 31.28 kg/m    General: The patient is oriented to person, place and time at an age-appropriate manner.   HEENT: The facial features are normal and symmetric.   The gaze is conjugate and extraocular motions are full and intact.The pupils are equal, round and reactive to light.  The ears are of normal position and configuration and hearing is grossly normal.  The oropharynx is benign and the tongue protrudes normally without fasciculations.  Neck: The neck is supple with full range of motion  Chest: The chest is of normal configuration and clear by auscultation.   Heart: A normal S1 and S2 are heard without murmurs or gallops.  Abdomen: The abdomen is soft and benign without organomegaly.   Extremities: The extremities are of normal configuration without contractures nor hyperlaxities. Strength and tone appear to be normal and symmetric. Deep tendon reflexes are normal at the biceps, patellar and ankles, and there is no clonus at the ankles.   Integument: The integument is  of normal appearance without significant changes in pigmentation, birthmarks, or lesions.  Neurologic:  Mental Status Exam:  Alert, awake. Fully oriented. No dysarthria, no dysphasia. Speech of normal fluency.  Cranial Nerves:  PERRLA, EOMs intact, no nystagmus, facial movements symmetric. No atrophy or fasciculations.    Motor:  Normal tone in all four extremities, no atrophy or fasciculations. 5/5 strength bilaterally in shoulder abduction, elbow extensors and flexors, , hip flexors, knee extensors and flexors. No " "tremors.  Sensory:  Negative Romberg.  Reflexes:  2+ and symmetric in biceps, patellar, Achilles; There is no clonus at the ankles.  Gait:  Normal gait; normal arm swing and stance.ankles.    LABORATORY RESULTS: Previous studies showed pathologically elevated blood phenylalanine levels as well as specific PAH mutatons and excluded disorders of biopterin recycling. Laboratory studies from the past year were reviewed.    ASSESSMENT:  1.) Phenylketonuria (PKU).  2.) Current response to the only done okay combination of Kuvan and a low protein diet has not achieved the nationally recognized goal of blood phenylalanine levesl at 6 mg/dL or lower.  His most recent blood phenylalanine level was 11.8 mg/dl done at his last annual PKU Clinic 1 year ago, March 12, 2018.  We will repeat the blood phenylalanine level today while he continues on oral Kuvan with a low protein diet; he may be a good candidate for the new Palynziq  (pegvaliase-pqpz).    PLAN/RECOMMENDATIONS:    1.) Send blood \"tyrosine and phenylalanine\" levels monthly.  2.) Metabolic PKU Dietician Consult today for regular diet assessment and nutritional management including the patient's prescribed phenylalanine intake.  3.) Clinical Pharmacotherapy consultation with Pharmacotherapy for Inborn Errors of Metabolism (PIMD) expert regarding sapropterin dihydrochloride medication for PKU.  4.) Continue low phenylalanine diet (250-400 mg phenylalanine/day, or as modified by consultation with the Metabolic PKU Dietician considering recent blood phenylalanine levels, diet history, and impact of sapropterin, if taken).  5.) Return to PKU Clinic in 12 months, but sooner if he starts on the new medication Palynziq  (pegvaliase-pqpz) which requires frequent visits and much more frequent blood phenylalanine levels.   6).  Consultation with  today regarding Palynziq  (pegvaliase-pqpz)    FOLLOW-UP PLAN:  If you are returning to clinic to review specific laboratory tests, " please call the Genetic Counselor (see phone numbers below) to confirm that we have received all of the results from reference laboratories prior to your appointment. If we have not received all of the test results, please discuss re-scheduling your appointment.    I spent 40 minutes face-to-face with the patient reviewing the chief complaint, past medical history, and obtaining a review of systems as well as doing a physical examination; more than 50% of this time was spent in counseling and education regarding Maternal PKU, the availability of the local patient support group with information on the [place] PKU Foundation available at www.mnpku.org as well as the new medication Palynziq  (pegvaliase-pqpz), an injectable for PKU.    With warmest regards,     Augusto Bryant PhD MD  Professor of Pediatrics, and  [place] [facility] Human Genetics    Appointments: [phone]      Monday mornings: [facility]rapies for Lysosomal Diseases Clinic   Monday afternoons: PKU Clinic, Metabolism Clinic, and Genetics Clinic    Pharmacotherapy Consultant:  Daniela Aviles, PharmD, Pharmacotherapy for Metabolic Disorders (PIMD): [phone]  Leonardo Jones, PharmD, Pharmacotherapy for Metabolic Disorders (PIMD): [phone]    Genetic Counselor:  Kimberlee Renteria MS, [facility] (Genetic test Results): [phone]  Ginny Sanchez MS, [facility], (Genetic test results: [phone])    Metabolic Dietician:  Julianna Villa, Registered Dietician: [phone]  Tiffanie Mccormack, Registered Dietician: [phone]    :  CORDELL Swift, [facility], Clinical , [phone]    Advanced Therapies Clinic Scheduler:  Jakci Cisneros, [phone]    Nurse Coordinator, Metabolism and Genetics (afternoon clinics):  Liza Lane, SOLEDADN, RN, PHN  [phone]    Copy to Referring Physician and Primary Care Practitioner:  Dr. Fleming [address] [address]    Copy  to patient:  Mukesh Arenasster  [address]  [place]  [facility] [address]

## 2019-03-14 LAB
PHE SERPL-MCNC: 11.8 MG/DL (ref 0.5–1.6)
TYROSINE SERPL-MCNC: 0.8 MG/DL (ref 0.6–2.4)

## 2019-05-29 DIAGNOSIS — E70.1 PHENYLKETONURIA (PKU) (H): Primary | ICD-10-CM

## 2019-05-30 RX ORDER — PEGVALIASE-PQPZ 2.5 MG/.5ML
INJECTION, SOLUTION SUBCUTANEOUS
Qty: 3 ML | Refills: 1 | Status: SHIPPED | OUTPATIENT
Start: 2019-05-30 | End: 2022-05-27

## 2019-05-30 RX ORDER — PEGVALIASE-PQPZ 20 MG/ML
INJECTION, SOLUTION SUBCUTANEOUS
Qty: 224 ML | Refills: 1 | Status: SHIPPED | OUTPATIENT
Start: 2019-05-30 | End: 2022-05-27

## 2019-05-30 RX ORDER — PEGVALIASE-PQPZ 20 MG/ML
INJECTION, SOLUTION SUBCUTANEOUS
Qty: 168 ML | Refills: 1 | Status: SHIPPED | OUTPATIENT
Start: 2019-05-30 | End: 2022-05-27

## 2019-05-30 RX ORDER — PEGVALIASE-PQPZ 10 MG/.5ML
INJECTION, SOLUTION SUBCUTANEOUS
Qty: 7 ML | Refills: 0 | Status: SHIPPED | OUTPATIENT
Start: 2019-05-30 | End: 2022-05-27

## 2019-06-04 ENCOUNTER — TELEPHONE (OUTPATIENT)
Dept: CONSULT | Facility: CLINIC | Age: 34
End: 2019-06-04

## 2019-06-04 NOTE — TELEPHONE ENCOUNTER
PA Initiation    Medication: Palynziq 2.5mg- Initiated  Insurance Company: [employer] - Phone [phone] Fax [phone]  Pharm[facility] Filling the Rx: [facility] - [address] [place] [address]  Filling Pharmacy Phone:    Filling Pharmacy Fax:    Start Date: 6/4/2019

## 2019-06-04 NOTE — TELEPHONE ENCOUNTER
PA Initiation    Medication: Palynziq 10mg- Initiated  Insurance Company: [employer] - Phone [phone] Fax [phone]  Pharmacy Filling the Rx: [facility] - [address] [place] [address]  Filling Pharmacy Phone:    Filling Pharmacy Fax:    Start Date: 6/4/2019

## 2019-06-04 NOTE — TELEPHONE ENCOUNTER
Prior Authorization Approval    Authorization Effective Date: 5/5/2019  Authorization Expiration Date: 6/3/2020  Medication: Palynziq 10mg- Approved  Approved Dose/Quantity: 10MG/ 14  Reference #: CASE ID [id number]   Insurance Company: [employer] - Phone [phone] Fax [phone]  Expected CoPay:       CoPay Card Available:      Foundation Assistance Needed:    Which Pharmacy is filling the prescription (Not needed for infusion/clinic administered): [facility] - [address] [place] [address]  Pharmacy Notified:    Patient Notified:

## 2019-06-04 NOTE — TELEPHONE ENCOUNTER
Prior Authorization Approval    Authorization Effective Date:    Authorization Expiration Date:    Medication: Palynziq 20mg- Initiated  Approved Dose/Quantity: 20mg/ 30  Reference #: CMM Key [id number]   Insurance Company: [employer] - Phone [phone] Fax [phone]  Expected CoPay:       CoPay Card Available:      Foundation Assistance Needed:    Which [facility] [place] [facility] [address] [place] [address]  Pharmacy Notified:    Patient Notified:

## 2019-06-04 NOTE — TELEPHONE ENCOUNTER
Prior Authorization Approval    Authorization Effective Date: 5/5/2019  Authorization Expiration Date: 6/3/2020  Medication: Palynziq 2.5mg- Approved  Approved Dose/Quantity: 2.5MG/ 6  Reference #: CASE ID [id number]   Insurance Company: [employer] - Phone [phone] Fax [phone]  Expected CoPay:       CoPay Card Available:      Foundation Assistance Needed:    Which [facility] the prescription [facility] for infusion[facility]clinic administered[facility] [address]  Pharmacy Notified:    Patient Notified:

## 2019-06-04 NOTE — TELEPHONE ENCOUNTER
PA Initiation    Medication: Palynziq 20mg- Initiated  Insurance Company: [employer] - Phone [phone] Fax [phone]  Pharmacy Filling the Rx: [facility] - [address] [place] [address]  Filling Pharmacy Phone:    Filling Pharmacy Fax:    Start Date: 6/4/2019

## 2019-06-17 PROCEDURE — 84510 ASSAY OF TYROSINE: CPT | Performed by: PEDIATRICS

## 2019-06-21 LAB
PHE SERPL-MCNC: 9.2 MG/DL (ref 0.5–1.6)
TYROSINE SERPL-MCNC: 1.3 MG/DL (ref 0.6–2.4)

## 2019-06-24 ENCOUNTER — OFFICE VISIT (OUTPATIENT)
Dept: FAMILY MEDICINE | Facility: CLINIC | Age: 34
End: 2019-06-24
Payer: COMMERCIAL

## 2019-06-24 VITALS
TEMPERATURE: 98.3 F | OXYGEN SATURATION: 97 % | BODY MASS INDEX: 30.77 KG/M2 | DIASTOLIC BLOOD PRESSURE: 82 MMHG | HEART RATE: 79 BPM | SYSTOLIC BLOOD PRESSURE: 132 MMHG | WEIGHT: 239 LBS

## 2019-06-24 DIAGNOSIS — Z11.3 SCREEN FOR STD (SEXUALLY TRANSMITTED DISEASE): ICD-10-CM

## 2019-06-24 DIAGNOSIS — N20.0 KIDNEY STONES: ICD-10-CM

## 2019-06-24 DIAGNOSIS — Z83.3 FAMILY HISTORY OF DIABETES MELLITUS: ICD-10-CM

## 2019-06-24 DIAGNOSIS — F41.1 GENERALIZED ANXIETY DISORDER: ICD-10-CM

## 2019-06-24 DIAGNOSIS — E70.1 PKU (PHENYLKETONURIA) (H): ICD-10-CM

## 2019-06-24 DIAGNOSIS — F32.0 MILD MAJOR DEPRESSION (H): ICD-10-CM

## 2019-06-24 DIAGNOSIS — R53.83 FATIGUE, UNSPECIFIED TYPE: ICD-10-CM

## 2019-06-24 DIAGNOSIS — M08.3: ICD-10-CM

## 2019-06-24 DIAGNOSIS — Z00.00 ROUTINE GENERAL MEDICAL EXAMINATION AT A HEALTH CARE FACILITY: Primary | ICD-10-CM

## 2019-06-24 LAB
ANION GAP SERPL CALCULATED.3IONS-SCNC: 9 MMOL/L (ref 3–14)
BUN SERPL-MCNC: 10 MG/DL (ref 7–30)
CALCIUM SERPL-MCNC: 9 MG/DL (ref 8.5–10.1)
CHLORIDE SERPL-SCNC: 106 MMOL/L (ref 94–109)
CO2 SERPL-SCNC: 24 MMOL/L (ref 20–32)
CREAT SERPL-MCNC: 0.72 MG/DL (ref 0.66–1.25)
ERYTHROCYTE [DISTWIDTH] IN BLOOD BY AUTOMATED COUNT: 12.6 % (ref 10–15)
GFR SERPL CREATININE-BSD FRML MDRD: >90 ML/MIN/{1.73_M2}
GLUCOSE SERPL-MCNC: 104 MG/DL (ref 70–99)
HBA1C MFR BLD: 5.1 % (ref 0–5.6)
HCT VFR BLD AUTO: 43.6 % (ref 40–53)
HGB BLD-MCNC: 14.9 G/DL (ref 13.3–17.7)
MCH RBC QN AUTO: 31.6 PG (ref 26.5–33)
MCHC RBC AUTO-ENTMCNC: 34.2 G/DL (ref 31.5–36.5)
MCV RBC AUTO: 93 FL (ref 78–100)
PLATELET # BLD AUTO: 339 10E9/L (ref 150–450)
POTASSIUM SERPL-SCNC: 3.9 MMOL/L (ref 3.4–5.3)
RBC # BLD AUTO: 4.71 10E12/L (ref 4.4–5.9)
SODIUM SERPL-SCNC: 139 MMOL/L (ref 133–144)
TSH SERPL DL<=0.005 MIU/L-ACNC: 2.43 MU/L (ref 0.4–4)
WBC # BLD AUTO: 5.9 10E9/L (ref 4–11)

## 2019-06-24 PROCEDURE — 84510 ASSAY OF TYROSINE: CPT | Performed by: PEDIATRICS

## 2019-06-24 PROCEDURE — 83036 HEMOGLOBIN GLYCOSYLATED A1C: CPT | Performed by: NURSE PRACTITIONER

## 2019-06-24 PROCEDURE — 86780 TREPONEMA PALLIDUM: CPT | Performed by: NURSE PRACTITIONER

## 2019-06-24 PROCEDURE — 36415 COLL VENOUS BLD VENIPUNCTURE: CPT | Performed by: NURSE PRACTITIONER

## 2019-06-24 PROCEDURE — 99395 PREV VISIT EST AGE 18-39: CPT | Mod: 25 | Performed by: NURSE PRACTITIONER

## 2019-06-24 PROCEDURE — 82306 VITAMIN D 25 HYDROXY: CPT | Performed by: NURSE PRACTITIONER

## 2019-06-24 PROCEDURE — 86803 HEPATITIS C AB TEST: CPT | Performed by: NURSE PRACTITIONER

## 2019-06-24 PROCEDURE — 80048 BASIC METABOLIC PNL TOTAL CA: CPT | Performed by: NURSE PRACTITIONER

## 2019-06-24 PROCEDURE — 99213 OFFICE O/P EST LOW 20 MIN: CPT | Mod: 25 | Performed by: NURSE PRACTITIONER

## 2019-06-24 PROCEDURE — 85027 COMPLETE CBC AUTOMATED: CPT | Performed by: NURSE PRACTITIONER

## 2019-06-24 PROCEDURE — 87389 HIV-1 AG W/HIV-1&-2 AB AG IA: CPT | Performed by: NURSE PRACTITIONER

## 2019-06-24 PROCEDURE — 87591 N.GONORRHOEAE DNA AMP PROB: CPT | Performed by: NURSE PRACTITIONER

## 2019-06-24 PROCEDURE — 84443 ASSAY THYROID STIM HORMONE: CPT | Performed by: NURSE PRACTITIONER

## 2019-06-24 PROCEDURE — 87491 CHLMYD TRACH DNA AMP PROBE: CPT | Performed by: NURSE PRACTITIONER

## 2019-06-24 RX ORDER — BUSPIRONE HYDROCHLORIDE 15 MG/1
15 TABLET ORAL 2 TIMES DAILY
Qty: 60 TABLET | Refills: 1 | Status: SHIPPED | OUTPATIENT
Start: 2019-06-24 | End: 2019-08-27

## 2019-06-24 ASSESSMENT — ENCOUNTER SYMPTOMS
ARTHRALGIAS: 1
NAUSEA: 0
HEADACHES: 0
CONSTIPATION: 0
HEARTBURN: 0
JOINT SWELLING: 0
WEAKNESS: 0
PALPITATIONS: 0
CHILLS: 0
MYALGIAS: 0
ABDOMINAL PAIN: 0
PARESTHESIAS: 0
EYE PAIN: 0
DIZZINESS: 0
COUGH: 0
FREQUENCY: 0
HEMATURIA: 0
DIARRHEA: 0
FEVER: 0
NERVOUS/ANXIOUS: 0
HEMATOCHEZIA: 0
SORE THROAT: 0
DYSURIA: 0
SHORTNESS OF BREATH: 0

## 2019-06-24 ASSESSMENT — ANXIETY QUESTIONNAIRES
1. FEELING NERVOUS, ANXIOUS, OR ON EDGE: MORE THAN HALF THE DAYS
5. BEING SO RESTLESS THAT IT IS HARD TO SIT STILL: SEVERAL DAYS
6. BECOMING EASILY ANNOYED OR IRRITABLE: MORE THAN HALF THE DAYS
IF YOU CHECKED OFF ANY PROBLEMS ON THIS QUESTIONNAIRE, HOW DIFFICULT HAVE THESE PROBLEMS MADE IT FOR YOU TO DO YOUR WORK, TAKE CARE OF THINGS AT HOME, OR GET ALONG WITH OTHER PEOPLE: SOMEWHAT DIFFICULT
GAD7 TOTAL SCORE: 14
7. FEELING AFRAID AS IF SOMETHING AWFUL MIGHT HAPPEN: MORE THAN HALF THE DAYS
2. NOT BEING ABLE TO STOP OR CONTROL WORRYING: NEARLY EVERY DAY
3. WORRYING TOO MUCH ABOUT DIFFERENT THINGS: NEARLY EVERY DAY

## 2019-06-24 ASSESSMENT — PATIENT HEALTH QUESTIONNAIRE - PHQ9
SUM OF ALL RESPONSES TO PHQ QUESTIONS 1-9: 11
5. POOR APPETITE OR OVEREATING: SEVERAL DAYS

## 2019-06-24 ASSESSMENT — PAIN SCALES - GENERAL: PAINLEVEL: NO PAIN (0)

## 2019-06-24 NOTE — PROGRESS NOTES
SUBJECTIVE:   CC: Mukesh Wolff is an 33 year old male who presents for preventative health visit.     Healthy Habits:     Getting at least 3 servings of Calcium per day:  Yes    Bi-annual eye exam:  Yes    Dental care twice a year:  Yes    Sleep apnea or symptoms of sleep apnea:  Daytime drowsiness    Diet:  Vegetarian/vegan, Breakfast skipped and Other    Frequency of exercise:  None    Taking medications regularly:  Yes    Barriers to taking medications:  None    Medication side effects:  None    PHQ-2 Total Score: 2    Additional concerns today:  No    Follows with Dr. 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 Asks if its related to depression  May binge drink on the weekend, 1-2x/month  Otherwise, may have 1 drink a few days a week  Does not snore  PHQ9: 11  Saw a therapist years ago, diagnosed with MDD and anxiety  Thinks he was on sertraline in the past  Caused decreased libido  Denies suicidal ideation          Today's PHQ-2 Score:   PHQ-2 ( 1999 [employer]) 6/24/2019   Q1: Little interest or pleasure in doing things 1   Q2: Feeling down, depressed or hopeless 1   PHQ-2 Score 2   Q1: Little interest or pleasure in doing things Several days   Q2: Feeling down, depressed or hopeless Several days   PHQ-2 Score 2       Abuse: Current or Past(Physical, Sexual or Emotional)- No  Do you feel safe in your environment? Yes    Social History     Tobacco Use     Smoking status: Former Smoker     Types: Cigarettes     Smokeless tobacco: Former User     Types: Chew   Substance Use Topics     Alcohol use: Yes     If you drink alcohol do you typically have >3 drinks per day or >7 drinks per week? No    Alcohol Use 6/24/2019   Prescreen: >3 drinks/day or >7 drinks/week? Yes   AUDIT SCORE  12       Last PSA: No results found for: PSA    Reviewed orders with patient. Reviewed health maintenance and updated orders accordingly - Yes  Lab work is in process  Labs reviewed in EPIC  BP  Readings from Last 3 Encounters:   06/24/19 132/82   03/11/19 117/76   07/06/18 125/84    Wt Readings from Last 3 Encounters:   06/24/19 108.4 kg (239 lb)   03/11/19 110.2 kg (242 lb 15.2 oz)   07/06/18 104.4 kg (230 lb 4 oz)                  Patient Active Problem List   Diagnosis     PKU (phenylketonuria) (H)     Kidney stones     Juvenile seronegative polyarthritis (H)     Mild major depression (H)     History reviewed. No pertinent surgical history.    Social History     Tobacco Use     Smoking status: Former Smoker     Types: Cigarettes     Smokeless tobacco: Former User     Types: Chew   Substance Use Topics     Alcohol use: Yes     Family History   Problem Relation Age of Onset     Rheumatoid Arthritis Mother      Diabetes Paternal Uncle      Rheumatoid Arthritis Maternal Grandmother            Reviewed and updated as needed this visit by clinical staff  Tobacco  Allergies  Meds  Med Hx  Surg Hx  Fam Hx  Soc Hx        Reviewed and updated as needed this visit by Provider        Past Medical History:   Diagnosis Date     PKU (phenylketonuria) (H)         Review of Systems   Constitutional: Negative for chills and fever.   HENT: Negative for congestion, ear pain, hearing loss and sore throat.    Eyes: Negative for pain and visual disturbance.   Respiratory: Negative for cough and shortness of breath.    Cardiovascular: Negative for chest pain, palpitations and peripheral edema.   Gastrointestinal: Negative for abdominal pain, constipation, diarrhea, heartburn, hematochezia and nausea.   Genitourinary: Negative for discharge, dysuria, frequency, genital sores, hematuria, impotence and urgency.   Musculoskeletal: Positive for arthralgias. Negative for joint swelling and myalgias.   Skin: Negative for rash.   Neurological: Negative for dizziness, weakness, headaches and paresthesias.   Psychiatric/Behavioral: Negative for mood changes. The patient is not nervous/anxious.          OBJECTIVE:   /82 (BP  Location: Right arm, Patient Position: Chair, Cuff Size: Adult Large)   Pulse 79   Temp 98.3  F (36.8  C) (Oral)   Wt 108.4 kg (239 lb)   SpO2 97%   BMI 30.77 kg/m      Physical Exam  GENERAL: healthy, alert and no distress  EYES: Eyes grossly normal to inspection, PERRL and conjunctivae and sclerae normal  HENT: ear canals and TM's normal, nose and mouth without ulcers or lesions  NECK: no adenopathy, no asymmetry, masses, or scars and thyroid normal to palpation  RESP: lungs clear to auscultation - no rales, rhonchi or wheezes  CV: regular rate and rhythm, normal S1 S2, no S3 or S4, no murmur, click or rub, no peripheral edema and peripheral pulses strong  ABDOMEN: soft, nontender, no hepatosplenomegaly, no masses and bowel sounds normal  MS: no gross musculoskeletal defects noted, no edema  SKIN: no suspicious lesions or rashes  NEURO: Normal strength and tone, mentation intact and speech normal  PSYCH: mentation appears normal, affect normal/bright    Diagnostic Test Results:  Labs reviewed in Epic    ASSESSMENT/PLAN:       ICD-10-CM    1. Routine general medical examination at a health care facility Z00.00 Basic metabolic panel   2. Mild major depression (H) F32.0    3. PKU (phenylketonuria) (H) E70.0    4. Juvenile seronegative polyarthritis (H) M08.3    5. Kidney stones N20.0    6. Screen for STD (sexually transmitted disease) Z11.3 Chlamydia trachomatis PCR     Hepatitis C antibody     HIV Antigen Antibody Combo     Neisseria gonorrhoeae PCR     Treponema Abs w Reflex to RPR and Titer   7. Fatigue, unspecified type R53.83 TSH with free T4 reflex     CBC with platelets     Vitamin D Deficiency   8. Generalized anxiety disorder F41.1 busPIRone (BUSPAR) 15 MG tablet   9. Family history of diabetes mellitus Z83.3 Hemoglobin A1c     Discussed treatment options for depression and anxiety. Strongly encourage therapy. He has used EAP at his work and will try to use this resource again. Consider serotonin  "specific reuptake inhibitor, Buspar and Bupropion. May be more likely to experience decreased libido with serotonin specific reuptake inhibitor given past side effect. Caution with Wellbutrin given alcohol use. Will try Buspar. Reviewed risks and benefits. Also strongly encourage reduction of alcohol, especially binge drinking episodes. He will follow up in 6-8 weeks. Ok for phone visit or E-visit.   COUNSELING:   Reviewed preventive health counseling, as reflected in patient instructions       Regular exercise       Healthy diet/nutrition       Safe sex practices/STD prevention    Estimated body mass index is 30.77 kg/m  as calculated from the following:    Height as of 3/11/19: 1.877 m (6' 1.9\").    Weight as of this encounter: 108.4 kg (239 lb).     Weight management plan: Discussed healthy diet and exercise guidelines     reports that he has quit smoking. His smoking use included cigarettes. He has quit using smokeless tobacco. His smokeless tobacco use included chew.      Counseling Resources:  ATP IV Guidelines  Pooled Cohorts Equation Calculator  FRAX Risk Assessment  ICSI Preventive Guidelines  Dietary Guidelines for Americans, 2010  USDA's MyPlate  ASA Prophylaxis  Lung CA Screening    MARIELLE Rehman CNP  [facility]  "

## 2019-06-24 NOTE — PATIENT INSTRUCTIONS
You will start the medication Buspar to help with anxiety  Take 1/2 tablet every morning for 3-5 days, then 1/2 tablet twice daily for 3-5 days, then 1 tablet in the morning and 1/2 tablet in the evening for 3-5 days then 1 tablet twice daily    I recommend scheduling with a therapist. You can check with your employee assistance program    Follow up with me in 6-8 weeks      Preventive Health Recommendations  Male Ages 26 - 39    Yearly exam:             See your health care provider every year in order to  o   Review health changes.   o   Discuss preventive care.    o   Review your medicines if your doctor has prescribed any.    You should be tested each year for STDs (sexually transmitted diseases), if you re at risk.     After age 35, talk to your provider about cholesterol testing. If you are at risk for heart disease, have your cholesterol tested at least every 5 years.     If you are at risk for diabetes, you should have a diabetes test (fasting glucose).  Shots: Get a flu shot each year. Get a tetanus shot every 10 years.     Nutrition:    Eat at least 5 servings of fruits and vegetables daily.     Eat whole-grain bread, whole-wheat pasta and brown rice instead of white grains and rice.     Get adequate Calcium and Vitamin D.     Lifestyle    Exercise for at least 150 minutes a week (30 minutes a day, 5 days a week). This will help you control your weight and prevent disease.     Limit alcohol to one drink per day.     No smoking.     Wear sunscreen to prevent skin cancer.     See your dentist every six months for an exam and cleaning.

## 2019-06-25 LAB
DEPRECATED CALCIDIOL+CALCIFEROL SERPL-MC: 22 UG/L (ref 20–75)
HCV AB SERPL QL IA: NONREACTIVE
HIV 1+2 AB+HIV1 P24 AG SERPL QL IA: NONREACTIVE
T PALLIDUM AB SER QL: NONREACTIVE

## 2019-06-25 ASSESSMENT — ANXIETY QUESTIONNAIRES: GAD7 TOTAL SCORE: 14

## 2019-06-26 LAB
C TRACH DNA SPEC QL NAA+PROBE: NEGATIVE
N GONORRHOEA DNA SPEC QL NAA+PROBE: NEGATIVE
SPECIMEN SOURCE: NORMAL
SPECIMEN SOURCE: NORMAL

## 2019-06-27 DIAGNOSIS — E70.1 PHENYLKETONURIA (PKU) (H): Primary | ICD-10-CM

## 2019-06-27 NOTE — PROGRESS NOTES
"June 27, 2019  Per Dr. Bryant's most recent office visit note with patient-  PLAN/RECOMMENDATIONS:   1.) Send blood \"tyrosine and phenylalanine\" levels monthly.    New standing orders placed.     SOLEDAD SandhuN, RN, PHN  Nurse Coordinator- Metabolism & Genetics    "

## 2019-06-27 NOTE — RESULT ENCOUNTER NOTE
Mukesh Wolff,    Your lab results have been released to [facility].   Your labs look great!  STD screening was negative. You are not diabetic. Blood counts, thyroid and kidney function are normal. No abnormalities to suggest a cause for your fatigue. It may be related to depression and anxiety and hopefully improves with the new medication.   Please call the clinic if you have any concerns [phone].    MARIELLE Rehman [facility]

## 2019-07-01 ENCOUNTER — TELEPHONE (OUTPATIENT)
Dept: NUTRITION | Facility: CLINIC | Age: 34
End: 2019-07-01

## 2019-07-01 LAB
PHE SERPL-MCNC: 10.6 MG/DL (ref 0.5–1.6)
TYROSINE SERPL-MCNC: 0.9 MG/DL (ref 0.6–2.4)

## 2019-07-02 DIAGNOSIS — E70.1 PHENYLKETONURIA (PKU) (H): ICD-10-CM

## 2019-07-02 PROCEDURE — 84510 ASSAY OF TYROSINE: CPT | Performed by: PEDIATRICS

## 2019-07-08 ENCOUNTER — HOSPITAL ENCOUNTER (OUTPATIENT)
Facility: CLINIC | Age: 34
Setting detail: SPECIMEN
Discharge: HOME | End: 2019-07-08
Admitting: PEDIATRICS
Payer: COMMERCIAL

## 2019-07-08 PROCEDURE — 84510 ASSAY OF TYROSINE: CPT | Performed by: PEDIATRICS

## 2019-07-09 ENCOUNTER — TELEPHONE (OUTPATIENT)
Dept: CONSULT | Facility: CLINIC | Age: 34
End: 2019-07-09

## 2019-07-09 DIAGNOSIS — E70.1 PHENYLKETONURIA (PKU) (H): ICD-10-CM

## 2019-07-09 LAB
PHE SERPL-MCNC: 8.5 MG/DL (ref 0.5–1.6)
TYROSINE SERPL-MCNC: 1.3 MG/DL (ref 0.6–2.4)

## 2019-07-09 NOTE — TELEPHONE ENCOUNTER
Prior Authorization Approval    Authorization Effective Date: 6/9/2019  Authorization Expiration Date: 7/8/2020  Medication: Kuvan - Approved  Approved Dose/Quantity: 500mg/ 120  Reference #: case id [id number]   Insurance Company: [employer] - Phone [phone] Fax [phone]  Expected CoPay:       CoPay Card Available:      Foundation Assistance Needed:    Which Pharmacy is filling the prescription (Not needed for infusion/clinic administered): [facility] - [address] [place] [address]  Pharmacy Notified:    Patient Notified:

## 2019-07-09 NOTE — TELEPHONE ENCOUNTER
PA Initiation    Medication: Kuvan - Initiated  Insurance Company: [employer] - Phone [phone] Fax [phone]  Pharmacy Filling the Rx: [facility] - [address] [place] [address]  Filling Pharmacy Phone:    Filling Pharmacy Fax:    Start Date: 7/9/2019

## 2019-07-12 ENCOUNTER — TELEPHONE (OUTPATIENT)
Dept: NUTRITION | Facility: CLINIC | Age: 34
End: 2019-07-12

## 2019-07-15 ENCOUNTER — TELEPHONE (OUTPATIENT)
Dept: NUTRITION | Facility: CLINIC | Age: 34
End: 2019-07-15

## 2019-07-15 LAB
PHE SERPL-MCNC: 10.6 MG/DL (ref 0.5–1.6)
TYROSINE SERPL-MCNC: 1.6 MG/DL (ref 0.6–2.4)

## 2019-07-19 ENCOUNTER — TELEPHONE (OUTPATIENT)
Dept: CONSULT | Facility: CLINIC | Age: 34
End: 2019-07-19

## 2019-07-26 RX ORDER — DIPHENHYDRAMINE HYDROCHLORIDE 50 MG/ML
50 INJECTION INTRAMUSCULAR; INTRAVENOUS
Status: CANCELLED
Start: 2019-07-27

## 2019-07-26 RX ORDER — MONTELUKAST SODIUM 10 MG/1
10 TABLET ORAL ONCE
Status: CANCELLED | OUTPATIENT
Start: 2019-07-27

## 2019-07-26 RX ORDER — SODIUM CHLORIDE 9 MG/ML
1000 INJECTION, SOLUTION INTRAVENOUS CONTINUOUS PRN
Status: CANCELLED
Start: 2019-07-27

## 2019-07-26 RX ORDER — ALBUTEROL SULFATE 0.83 MG/ML
2.5 SOLUTION RESPIRATORY (INHALATION)
Status: CANCELLED | OUTPATIENT
Start: 2019-07-27

## 2019-07-26 RX ORDER — METHYLPREDNISOLONE SODIUM SUCCINATE 125 MG/2ML
125 INJECTION, POWDER, LYOPHILIZED, FOR SOLUTION INTRAMUSCULAR; INTRAVENOUS
Status: CANCELLED
Start: 2019-07-27

## 2019-07-26 RX ORDER — DIPHENHYDRAMINE HCL 25 MG
50 CAPSULE ORAL ONCE
Status: CANCELLED | OUTPATIENT
Start: 2019-07-27

## 2019-07-26 RX ORDER — EPINEPHRINE 0.3 MG/.3ML
0.3 INJECTION SUBCUTANEOUS EVERY 5 MIN PRN
Status: CANCELLED | OUTPATIENT
Start: 2019-07-27

## 2019-07-26 RX ORDER — CETIRIZINE HYDROCHLORIDE 10 MG/1
10 TABLET ORAL ONCE
Status: CANCELLED | OUTPATIENT
Start: 2019-07-27

## 2019-07-26 RX ORDER — FAMOTIDINE 40 MG/1
40 TABLET, FILM COATED ORAL ONCE
Status: CANCELLED | OUTPATIENT
Start: 2019-07-27

## 2019-07-26 RX ORDER — ALBUTEROL SULFATE 90 UG/1
1-2 AEROSOL, METERED RESPIRATORY (INHALATION)
Status: CANCELLED
Start: 2019-07-27

## 2019-07-26 RX ORDER — ACETAMINOPHEN 325 MG/1
650 TABLET ORAL ONCE
Status: CANCELLED | OUTPATIENT
Start: 2019-07-27

## 2019-08-27 ENCOUNTER — VIRTUAL VISIT (OUTPATIENT)
Dept: FAMILY MEDICINE | Facility: CLINIC | Age: 34
End: 2019-08-27
Payer: COMMERCIAL

## 2019-08-27 DIAGNOSIS — F32.0 MILD MAJOR DEPRESSION (H): Primary | ICD-10-CM

## 2019-08-27 DIAGNOSIS — E70.1 PKU (PHENYLKETONURIA) (H): ICD-10-CM

## 2019-08-27 DIAGNOSIS — F41.1 GENERALIZED ANXIETY DISORDER: ICD-10-CM

## 2019-08-27 PROCEDURE — 98967 PH1 ASSMT&MGMT NQHP 11-20: CPT | Performed by: NURSE PRACTITIONER

## 2019-08-27 RX ORDER — BUSPIRONE HYDROCHLORIDE 10 MG/1
10 TABLET ORAL 2 TIMES DAILY
Qty: 60 TABLET | Refills: 1 | Status: SHIPPED | OUTPATIENT
Start: 2019-08-27 | End: 2022-05-27

## 2019-08-27 ASSESSMENT — ANXIETY QUESTIONNAIRES
2. NOT BEING ABLE TO STOP OR CONTROL WORRYING: SEVERAL DAYS
3. WORRYING TOO MUCH ABOUT DIFFERENT THINGS: NOT AT ALL
1. FEELING NERVOUS, ANXIOUS, OR ON EDGE: NOT AT ALL
6. BECOMING EASILY ANNOYED OR IRRITABLE: NOT AT ALL
GAD7 TOTAL SCORE: 1
7. FEELING AFRAID AS IF SOMETHING AWFUL MIGHT HAPPEN: NOT AT ALL
5. BEING SO RESTLESS THAT IT IS HARD TO SIT STILL: NOT AT ALL
IF YOU CHECKED OFF ANY PROBLEMS ON THIS QUESTIONNAIRE, HOW DIFFICULT HAVE THESE PROBLEMS MADE IT FOR YOU TO DO YOUR WORK, TAKE CARE OF THINGS AT HOME, OR GET ALONG WITH OTHER PEOPLE: NOT DIFFICULT AT ALL

## 2019-08-27 ASSESSMENT — PATIENT HEALTH QUESTIONNAIRE - PHQ9: 5. POOR APPETITE OR OVEREATING: NOT AT ALL

## 2019-08-28 ASSESSMENT — ANXIETY QUESTIONNAIRES: GAD7 TOTAL SCORE: 1

## 2019-09-17 ENCOUNTER — OFFICE VISIT (OUTPATIENT)
Dept: FAMILY MEDICINE | Facility: CLINIC | Age: 34
End: 2019-09-17
Payer: COMMERCIAL

## 2019-09-17 VITALS
SYSTOLIC BLOOD PRESSURE: 116 MMHG | TEMPERATURE: 98.2 F | BODY MASS INDEX: 30 KG/M2 | HEART RATE: 83 BPM | DIASTOLIC BLOOD PRESSURE: 78 MMHG | WEIGHT: 233 LBS | OXYGEN SATURATION: 97 %

## 2019-09-17 DIAGNOSIS — R09.A2 GLOBUS SENSATION: ICD-10-CM

## 2019-09-17 DIAGNOSIS — J06.9 UPPER RESPIRATORY TRACT INFECTION, UNSPECIFIED TYPE: Primary | ICD-10-CM

## 2019-09-17 PROCEDURE — 99213 OFFICE O/P EST LOW 20 MIN: CPT | Performed by: NURSE PRACTITIONER

## 2019-09-17 RX ORDER — FLUTICASONE PROPIONATE 50 MCG
1-2 SPRAY, SUSPENSION (ML) NASAL DAILY
Qty: 16 G | Refills: 1 | Status: SHIPPED | OUTPATIENT
Start: 2019-09-17 | End: 2021-08-24

## 2019-09-17 RX ORDER — AZITHROMYCIN 250 MG/1
TABLET, FILM COATED ORAL
Qty: 6 TABLET | Refills: 0 | Status: SHIPPED | OUTPATIENT
Start: 2019-09-17 | End: 2021-08-24

## 2019-09-17 ASSESSMENT — PATIENT HEALTH QUESTIONNAIRE - PHQ9: SUM OF ALL RESPONSES TO PHQ QUESTIONS 1-9: 0

## 2019-09-17 ASSESSMENT — PAIN SCALES - GENERAL: PAINLEVEL: NO PAIN (0)

## 2019-09-17 NOTE — PROGRESS NOTES
Subjective     Mukesh Wolff is a 33 year old male who presents to clinic today for the following health issues:    HPI   Acute Illness   Acute illness concerns: URI  Onset: 2 weeks    Fever: no    Chills/Sweats: no    Headache (location?): no    Sinus Pressure:no    Conjunctivitis:  no    Ear Pain: YES: right    Rhinorrhea: no    Congestion: YES- chest    Sore Throat: YES- lump on the right side of his throat     Cough: YES-non-productive    Wheeze: no    Decreased Appetite: YES    Nausea: no    Vomiting: no    Diarrhea:  no    Dysuria/Freq.: no    Fatigue/Achiness: YES-  A little bit    Sick/Strep Exposure: no      Therapies Tried and outcome: vitamins C and Zinc, Dayquil and nyquil OCT allergy med    He develops sinus congestion and runny nose 2 weeks ago  Then develops a cough productive of clear sputum  Cough is mostly resolving  Continues to have congestion, sore throat and sensation that something is in his throat  Has some difficulty swallowing solids. Feels like something is there  No pain  He was taking some over the counter medications and stopped those  He did not find them very relieving    Patient Active Problem List   Diagnosis     PKU (phenylketonuria) (H)     Kidney stones     Juvenile seronegative polyarthritis (H)     Mild major depression (H)     History reviewed. No pertinent surgical history.    Social History     Tobacco Use     Smoking status: Former Smoker     Types: Cigarettes     Smokeless tobacco: Former User     Types: Chew   Substance Use Topics     Alcohol use: Yes     Family History   Problem Relation Age of Onset     Rheumatoid Arthritis Mother      Diabetes Paternal Uncle      Rheumatoid Arthritis Maternal Grandmother              Reviewed and updated as needed this visit by Provider         Review of Systems   ROS COMP: Constitutional, HEENT, cardiovascular, pulmonary, gi and gu systems are negative, except as otherwise noted.      Objective    /78 (BP Location: Right arm,  Patient Position: Chair, Cuff Size: Adult Regular)   Pulse 83   Temp 98.2  F (36.8  C) (Oral)   Wt 105.7 kg (233 lb)   SpO2 97%   BMI 30.00 kg/m    Body mass index is 30 kg/m .  Physical Exam   GENERAL: healthy, alert and no distress  HENT: normal cephalic/atraumatic, ear canals and TM's normal, nose and mouth without ulcers or lesions, pharyngeal erythema without masses or lesion, no tonsillar hypertrophy, oral mucous membranes moist and sinuses: not tender  NECK: no adenopathy, no asymmetry, masses, or scars and thyroid normal to palpation  RESP: lungs clear to auscultation - no rales, rhonchi or wheezes  CV: regular rate and rhythm, normal S1 S2, no S3 or S4, no murmur, click or rub, no peripheral edema and peripheral pulses strong    Diagnostic Test Results:  Labs reviewed in Epic        Assessment & Plan       ICD-10-CM    1. Upper respiratory tract infection, unspecified type J06.9 OTOLARYNGOLOGY REFERRAL     azithromycin (ZITHROMAX) 250 MG tablet     fluticasone (FLONASE) 50 MCG/ACT nasal spray   2. Globus sensation F45.8 OTOLARYNGOLOGY REFERRAL        Will treat with antibiotic given longevity of symptoms  I suspect globus sensation is due to pharyngeal irritation or dried mucus from dehydration  If it does not resolve, prudent to see ENT    Patient Instructions   Start taking antibiotic and complete full course (5 days)  Make sure you are staying well hydrated - really push fluids the next several days  Use 2 sprays of Flonase in each nostril once daily until symptoms resolve  I recommend taking a daily anti-histamine which you can buy over the counter. I recommend cetirizine (brand name is Zyrtec)    If your symptoms are not improving I want you to schedule with ENT. The sensation of something in your throat should resolve completely - otherwise see ENT        MARIELLE Rehman [facility]

## 2019-09-17 NOTE — PATIENT INSTRUCTIONS
Start taking antibiotic and complete full course (5 days)  Make sure you are staying well hydrated - really push fluids the next several days  Use 2 sprays of Flonase in each nostril once daily until symptoms resolve  I recommend taking a daily anti-histamine which you can buy over the counter. I recommend cetirizine (brand name is Zyrtec)    If your symptoms are not improving I want you to schedule with ENT. The sensation of something in your throat should resolve completely - otherwise see ENT

## 2019-09-28 ENCOUNTER — HEALTH MAINTENANCE LETTER (OUTPATIENT)
Age: 34
End: 2019-09-28

## 2019-10-04 ENCOUNTER — TELEPHONE (OUTPATIENT)
Dept: PEDIATRICS | Facility: CLINIC | Age: 34
End: 2019-10-04

## 2019-10-04 NOTE — TELEPHONE ENCOUNTER
Callers Name: Sachin Westbrookers Phone Number: [phone]  Relationship to Patient: self  Best time of day to call: any  Is it ok to leave a detailed voicemail on this number: yes  Reason for Call:   Pt was calling to ask Dr. Bryant if he can send a referral over to [facility]. For Dr. Rosario Genetics/Metabloic specialist.     [facility]: referral phone # [phone].    He would like a call back regarding this.    Thank you.

## 2019-10-08 ENCOUNTER — TELEPHONE (OUTPATIENT)
Dept: FAMILY MEDICINE | Facility: CLINIC | Age: 34
End: 2019-10-08

## 2019-10-08 NOTE — TELEPHONE ENCOUNTER
Reason for Call: Request for an order or referral:    Order or referral being requested:  Referral    Date needed: as soon as possible    Has the patient been seen by the PCP for this problem? YES    Additional comments:  Patient calling about getting referral for PKU @ [facility] in [place] [place] [facility]\"" with Dr. Rosario.  Referral # [phone] any questions please call patient.    Phone number Patient can be reached at:  Home number on file [phone] (home)    Best Time:   Anytime     Can we leave a detailed message on this number?  YES    Call taken on 10/8/2019 at 9:12 AM by Jacque Sheikh

## 2019-10-08 NOTE — LETTER
[address] [address]  Phone: [phone]  Fax: [phone]    October 8, 2019        Mukesh Wolff  [address] [facility] [address]          To whom it may concern:    RE: Mukesh DOREEN Wolff    Mukesh Arenasster has a history of phenylketonuria (PKU) and I am referring him to Dr. Rosario in the Genetic/Metabolic Clinic at [facility] in [place].       Please contact me for questions or concerns.      Sincerely,        MARIELLE Rehman CNP

## 2019-10-08 NOTE — TELEPHONE ENCOUNTER
"See the MyChart encounter with interaction between patient and \"Dept of Metabolism and Genetics\"; they advise patient get referral from primary care provider.      Patient was last seen 9/17/19 by Coleen Card.    Routed to Coleen to address referral to [place].    Sofie Nguyen RN  [facility]      "

## 2020-02-20 ENCOUNTER — OFFICE VISIT (OUTPATIENT)
Dept: FAMILY MEDICINE | Facility: CLINIC | Age: 35
End: 2020-02-20
Payer: COMMERCIAL

## 2020-02-20 VITALS
HEIGHT: 74 IN | BODY MASS INDEX: 29.52 KG/M2 | TEMPERATURE: 98.8 F | OXYGEN SATURATION: 97 % | WEIGHT: 230 LBS | SYSTOLIC BLOOD PRESSURE: 122 MMHG | DIASTOLIC BLOOD PRESSURE: 79 MMHG | HEART RATE: 82 BPM

## 2020-02-20 DIAGNOSIS — M26.609 TMJ (TEMPOROMANDIBULAR JOINT SYNDROME): Primary | ICD-10-CM

## 2020-02-20 DIAGNOSIS — J02.9 SORE THROAT: ICD-10-CM

## 2020-02-20 DIAGNOSIS — M79.10 MYALGIA: ICD-10-CM

## 2020-02-20 LAB
DEPRECATED S PYO AG THROAT QL EIA: NEGATIVE
ERYTHROCYTE [DISTWIDTH] IN BLOOD BY AUTOMATED COUNT: 12.8 % (ref 10–15)
HCT VFR BLD AUTO: 41.8 % (ref 40–53)
HGB BLD-MCNC: 14.1 G/DL (ref 13.3–17.7)
MCH RBC QN AUTO: 31.2 PG (ref 26.5–33)
MCHC RBC AUTO-ENTMCNC: 33.7 G/DL (ref 31.5–36.5)
MCV RBC AUTO: 93 FL (ref 78–100)
PLATELET # BLD AUTO: 289 10E9/L (ref 150–450)
RBC # BLD AUTO: 4.52 10E12/L (ref 4.4–5.9)
SPECIMEN SOURCE: NORMAL
SPECIMEN SOURCE: NORMAL
STREP GROUP A PCR: NOT DETECTED
WBC # BLD AUTO: 6.1 10E9/L (ref 4–11)

## 2020-02-20 PROCEDURE — 40001204 ZZHCL STATISTIC STREP A RAPID: Performed by: FAMILY MEDICINE

## 2020-02-20 PROCEDURE — 87651 STREP A DNA AMP PROBE: CPT | Performed by: FAMILY MEDICINE

## 2020-02-20 PROCEDURE — 85027 COMPLETE CBC AUTOMATED: CPT | Performed by: FAMILY MEDICINE

## 2020-02-20 PROCEDURE — 36415 COLL VENOUS BLD VENIPUNCTURE: CPT | Performed by: FAMILY MEDICINE

## 2020-02-20 PROCEDURE — 99213 OFFICE O/P EST LOW 20 MIN: CPT | Performed by: FAMILY MEDICINE

## 2020-02-20 RX ORDER — CETIRIZINE HYDROCHLORIDE 10 MG/1
10 TABLET ORAL
COMMUNITY
Start: 2019-11-11 | End: 2020-11-10

## 2020-02-20 ASSESSMENT — MIFFLIN-ST. JEOR: SCORE: 2046.43

## 2020-02-20 NOTE — PATIENT INSTRUCTIONS
Patient Education     TMJ Syndrome  The temporomandibular joint (TMJ) is the joint that connects your lower jaw to your head. You can feel it in front of your ears when you open and close your mouth. TMJ disorders involve chronic or recurrent pain in the joint. When treated, symptoms of TMJ disorders usually go away within a few months.  Causes  There is no widely agreed-on cause of TMJ disorders. They have been linked to injury, arthritis, chronic fatigue syndrome, and fibromyalgia. A definite connection has not been shown, though.  Symptoms    Pain in the face, jaw, or neck    Pain with jaw movement or chewing    Locking or catching sensation of the jaw    Clicking, popping, or grinding sounds with movement of the TMJ    Headache    Ear pain  Home care  Modest, nonsurgical treatments are a good first step toward relieving symptoms. Try the approaches described below.    Rest the jaw by avoiding crunchy or hard-to-chew foods. Don t eat hard or sticky candies. Soft foods and liquids are easier on the jaw.    Protect your jaw while yawning. If you need to yawn, put your fist under your chin to prevent your mouth from opening up too wide.    To help relieve pain, try applying hot or cold packs to the painful area. Try both hot and cold to find out which works best for you. To make a cold pack, put ice cubes in a plastic bag that seals at the top. Wrap the bag in a clean, thin towel or cloth. Never put ice or an ice pack directly on the skin. If you use hot packs (small towels soaked in hot water), be careful not to burn yourself.    You may take acetaminophen or ibuprofen for pain, unless you were given a different pain medicine. (Note: If you have chronic liver or kidney disease or have ever had a stomach ulcer or gastrointestinal bleeding, talk with your healthcare provider before using these medicines. Also talk to your provider if you are taking medicine to prevent blood clots.) Don t give aspirin to a child  younger than age 19 unless directed by the child s provider. Taking aspirin can put a child at risk for Reye syndrome. This is a rare but very serious disorder that most often affects the brain and the liver.  Reducing stress  If stress seems to be contributing to your symptoms, try to identify the sources of stress in your life. These aren t always obvious. Common stressors include:    Everyday hassles. These include things such as traffic jams, missed appointments, or car trouble.    Major life changes. These can be good, such as a new baby or job promotion. And they can be bad, such as losing a job or losing a loved one.    Overload. The feeling that you have too many responsibilities and can't take care of everything at once.    Helplessness. Feeling like your problems are more than you can solve.  When possible, do something about your sources of stress. See if you can avoid hassles, limit the amount of change in your life at one time, and take breaks when you feel overloaded.  Unfortunately, many stressful situations cannot be avoided. So learning how to manage stress better is very important. Getting regular exercise, eating nutritious, balanced meals, and getting adequate rest all help to make everyday stress more manageable. Certain techniques are also helpful: relaxation and breathing exercises, visualization, biofeedback, meditation, or simply taking some time out to clear your mind. For more information, talk with your healthcare provider.  Follow-up care  Follow up with your healthcare provider, or as advised. Further testing and additional treatment may be required. If changes to your lifestyle do not improve your symptoms, talk with your healthcare provider about other available therapies. These include bite guards for help with teeth grinding, stress management techniques, and more. If stress is an important factor and does not respond to the above simple measures, talk with your healthcare provider  about a referral for stress management.  If X-rays were done, they will be reviewed by a specialist. You will be notified of the results, especially if they affect treatment.  Call 911  Call 911 if any of these occur:    Trouble breathing or swallowing, wheezing    Confusion    Extreme drowsiness or trouble awakening    Fainting or loss of consciousness    Rapid heart rate  When to seek medical advice  Call your healthcare provider right away if any of these occur:    Swollen or red face    Pain gets worse    Neck, mouth, tooth, or throat pain gets worse    Fever of 100.4 F (38 C) or higher, or as directed by your healthcare provider  Date Last Reviewed: 10/1/2017    9643-2602 [employer]. [address] [place] [address], [place] [address]. All rights reserved. This information is not intended as a substitute for professional medical care. Always follow your healthcare professional's instructions.

## 2020-02-20 NOTE — PROGRESS NOTES
Chief complaint: joint pain    Patient has PKU   And also was told had mild RA    4 days ago started having joint pains and felt achy all over   Thought maybe his arthritis flaring up  Woke up the next couple of days felt fine  But then 2 days ago as he was eating had a sudden wave of achy joints and bones and felt sick to his stomach and nauseous  Patient went home early and straight to bed because of feeling achy and nauseous  Took a nap and after the nap felt ok enough to eat dinner  Was able to keep dinner down  Yesterday woke up felt fine again  Then yesterday afternoon the left jaw started hurting progressively worsened the rest of the day  Hurt to chew   No dental pain  Also had a scratchy throat last night  Today joints feel good and nausea is better  Left jaw is hurting and patient taking tylenol   Throat is a little sore but getting better    No Known Allergies    Past Medical History:   Diagnosis Date     PKU (phenylketonuria) (H)        busPIRone (BUSPAR) 10 MG tablet, Take 1 tablet (10 mg) by mouth 2 times daily  cetirizine 10 MG PO tablet, Take 10 mg by mouth  Nutritional Supplements (GLYTACTIN RTD 15) LIQD, Take 4 Packages by mouth daily  PALYNZIQ 20 MG/ML injection, Inject as directed  ranitidine 150 MG PO tablet,   Sapropterin Dihydrochloride 100 MG PACK, Take 100 mg by mouth daily with food (Take 1 of 100mg Kuvan powder packet and 4 of 500mg Kuvan powder packet with total of 2100mg per day. Take with food)  Sapropterin Dihydrochloride 500 MG PACK, Take 2,000 mg by mouth daily with food (Take 1 of 100mg Kuvan powder packet and 4 of 500mg Kuvan powder packet with total of 2100mg per day. Take with food)  azithromycin (ZITHROMAX) 250 MG tablet, Two tablets first day, then one tablet daily for four days. (Patient not taking: Reported on 2/20/2020)  fluticasone (FLONASE) 50 MCG/ACT nasal spray, Spray 1-2 sprays into both nostrils daily (Patient not taking: Reported on 2/20/2020)  PALYNZIQ 10 MG/0.5ML  "injection, Inject as directed (Patient not taking: Reported on 9/17/2019)  PALYNZIQ 2.5 MG/0.5ML injection, Inject as directed (Patient not taking: Reported on 9/17/2019)  PALYNZIQ 20 MG/ML injection, Inject as directed (Patient not taking: Reported on 6/24/2019)    No current facility-administered medications on file prior to visit.       Social History     Tobacco Use     Smoking status: Former Smoker     Types: Cigarettes     Smokeless tobacco: Former User     Types: Chew   Substance Use Topics     Alcohol use: Yes     Drug use: No       ROS:  review of systems negative except for noted above.   No thoughts of harming self or others.     OBJECTIVE:  /79   Pulse 82   Temp 98.8  F (37.1  C) (Oral)   Ht 1.877 m (6' 1.9\")   Wt 104.3 kg (230 lb)   SpO2 97%   BMI 29.61 kg/m     General:   awake, alert, and cooperative.  NAD.   Head: Normocephalic, atraumatic.  Eyes: Conjunctiva clear,   ENT: midline nasal septum no congestion. Bilateral TYMPANINC MEMBRANE normal   Mouth: moist buccal mucosa nonhyperemic posterior pharyngeal wall tonsils not enlarged  Neck: supple no cervical lymphadenopathy  Left tmj tenderness   Heart: Regular rate and rhythm. No murmur.  Lungs: Chest is clear; no wheezes or rales.   Abdomen: soft non-tender.  Neuro: Alert and oriented - normal speech.  MS: Using extremities freely  PSYCH:  Normal affect, normal speech  SKIN: no obvious rashes    Diagnostic Test Results:  Results for orders placed or performed in visit on 02/20/20 (from the past 24 hour(s))   CBC with platelets   Result Value Ref Range    WBC 6.1 4.0 - 11.0 10e9/L    RBC Count 4.52 4.4 - 5.9 10e12/L    Hemoglobin 14.1 13.3 - 17.7 g/dL    Hematocrit 41.8 40.0 - 53.0 %    MCV 93 78 - 100 fl    MCH 31.2 26.5 - 33.0 pg    MCHC 33.7 31.5 - 36.5 g/dL    RDW 12.8 10.0 - 15.0 %    Platelet Count 289 150 - 450 10e9/L   Streptococcus A Rapid Scr w Reflx to PCR   Result Value Ref Range    Strep Specimen Description Throat     " Streptococcus Group A Rapid Screen Negative NEG^Negative         ASSESSMENT:    ICD-10-CM    1. TMJ (temporomandibular joint syndrome) M26.609 Group A Streptococcus PCR Throat Swab   2. Sore throat J02.9 Streptococcus A Rapid Scr w Reflx to PCR     CBC with platelets   3. Myalgia M79.10 CBC with platelets     JUST IN CASE       PLAN:   Myalgias sore throat chills x 4 days - already improving - suspect viral illness  Cbc normal   Influenza has been going around as well and symptoms may be secondary to influenza however patient already past 2 days of symptoms and treatment for influenza at this point is largely supportive. Offered testing for confirmation patient declined    Left jaw pain likely from TMJ   Other possible differentials reviewed  Recommend follow up with dentist to rule out dental infections and to start some conservative treatment  For tmj  See AVS    Alarm signs or symptoms discussed, if present recommend go to ER       Advised about symptoms which might herald more serious problems.        Lo Ryan MD

## 2020-04-06 DIAGNOSIS — E70.1 PHENYLKETONURIA (PKU) (H): ICD-10-CM

## 2020-04-06 RX ORDER — SAPROPTERIN DIHYDROCHLORIDE 500 MG/1
2000 POWDER, FOR SOLUTION ORAL
Qty: 120 EACH | Refills: 11 | Status: SHIPPED | OUTPATIENT
Start: 2020-04-06 | End: 2021-08-24

## 2020-04-06 RX ORDER — SAPROPTERIN DIHYDROCHLORIDE 100 MG/1
100 POWDER, FOR SOLUTION ORAL
Qty: 30 EACH | Refills: 11 | Status: SHIPPED | OUTPATIENT
Start: 2020-04-06 | End: 2021-08-24

## 2020-06-12 ENCOUNTER — TELEPHONE (OUTPATIENT)
Dept: CONSULT | Facility: CLINIC | Age: 35
End: 2020-06-12

## 2020-06-12 NOTE — TELEPHONE ENCOUNTER
I spoke with the a patient about having a follow-up virtual visit and Sachin, the patient mentioned that he's actually seeing a doctor, Dr. Jesús moreno at [place] instead. He will not return back to the PKU clinic unless something new comes up.

## 2020-07-03 ENCOUNTER — TELEPHONE (OUTPATIENT)
Dept: CONSULT | Facility: CLINIC | Age: 35
End: 2020-07-03

## 2020-07-03 NOTE — TELEPHONE ENCOUNTER
Prior Authorization Approval    Authorization Effective Date: 6/3/2020  Authorization Expiration Date: 7/3/2021  Medication: Kuvan- Approved  Approved Dose/Quantity: 500mg and 100mg  Reference #: [id number]   Insurance Company: [employer] - Phone [phone] Fax [phone]  Expected CoPay:       CoPay Card Available:      Foundation Assistance Needed:    Which Pharmacy is filling the prescription (Not needed for infusion/clinic administered): [facility], [place] - [address] [place] [address]  Pharmacy Notified:    Patient Notified:

## 2021-01-09 ENCOUNTER — HEALTH MAINTENANCE LETTER (OUTPATIENT)
Age: 36
End: 2021-01-09

## 2021-08-24 ENCOUNTER — OFFICE VISIT (OUTPATIENT)
Dept: FAMILY MEDICINE | Facility: CLINIC | Age: 36
End: 2021-08-24
Payer: COMMERCIAL

## 2021-08-24 VITALS
DIASTOLIC BLOOD PRESSURE: 70 MMHG | HEART RATE: 74 BPM | WEIGHT: 249.6 LBS | SYSTOLIC BLOOD PRESSURE: 120 MMHG | HEIGHT: 74 IN | TEMPERATURE: 97.6 F | BODY MASS INDEX: 32.03 KG/M2 | RESPIRATION RATE: 18 BRPM

## 2021-08-24 DIAGNOSIS — Z13.6 CARDIOVASCULAR SCREENING; LDL GOAL LESS THAN 130: ICD-10-CM

## 2021-08-24 DIAGNOSIS — Z23 NEED FOR VACCINATION: ICD-10-CM

## 2021-08-24 DIAGNOSIS — Z13.1 SCREENING FOR DIABETES MELLITUS: ICD-10-CM

## 2021-08-24 DIAGNOSIS — B07.0 PLANTAR WARTS: Primary | ICD-10-CM

## 2021-08-24 LAB
CHOLEST SERPL-MCNC: 86 MG/DL
FASTING STATUS PATIENT QL REPORTED: NO
HBA1C MFR BLD: 5 % (ref 0–5.6)
HDLC SERPL-MCNC: 37 MG/DL
LDLC SERPL CALC-MCNC: 26 MG/DL
NONHDLC SERPL-MCNC: 49 MG/DL
TRIGL SERPL-MCNC: 115 MG/DL

## 2021-08-24 PROCEDURE — 83036 HEMOGLOBIN GLYCOSYLATED A1C: CPT | Performed by: PHYSICIAN ASSISTANT

## 2021-08-24 PROCEDURE — 90715 TDAP VACCINE 7 YRS/> IM: CPT | Performed by: PHYSICIAN ASSISTANT

## 2021-08-24 PROCEDURE — 36415 COLL VENOUS BLD VENIPUNCTURE: CPT | Performed by: PHYSICIAN ASSISTANT

## 2021-08-24 PROCEDURE — 80061 LIPID PANEL: CPT | Performed by: PHYSICIAN ASSISTANT

## 2021-08-24 PROCEDURE — 17110 DESTRUCTION B9 LES UP TO 14: CPT | Performed by: PHYSICIAN ASSISTANT

## 2021-08-24 PROCEDURE — 90471 IMMUNIZATION ADMIN: CPT | Performed by: PHYSICIAN ASSISTANT

## 2021-08-24 PROCEDURE — 99213 OFFICE O/P EST LOW 20 MIN: CPT | Mod: 25 | Performed by: PHYSICIAN ASSISTANT

## 2021-08-24 ASSESSMENT — MIFFLIN-ST. JEOR: SCORE: 2131.93

## 2021-08-24 ASSESSMENT — PATIENT HEALTH QUESTIONNAIRE - PHQ9
SUM OF ALL RESPONSES TO PHQ QUESTIONS 1-9: 8
5. POOR APPETITE OR OVEREATING: MORE THAN HALF THE DAYS

## 2021-08-24 ASSESSMENT — ANXIETY QUESTIONNAIRES
GAD7 TOTAL SCORE: 12
7. FEELING AFRAID AS IF SOMETHING AWFUL MIGHT HAPPEN: MORE THAN HALF THE DAYS
1. FEELING NERVOUS, ANXIOUS, OR ON EDGE: SEVERAL DAYS
6. BECOMING EASILY ANNOYED OR IRRITABLE: MORE THAN HALF THE DAYS
5. BEING SO RESTLESS THAT IT IS HARD TO SIT STILL: SEVERAL DAYS
3. WORRYING TOO MUCH ABOUT DIFFERENT THINGS: MORE THAN HALF THE DAYS
2. NOT BEING ABLE TO STOP OR CONTROL WORRYING: MORE THAN HALF THE DAYS

## 2021-08-24 NOTE — PATIENT INSTRUCTIONS
Patient Education   Recommendations to keep you healthy:  1. See a medical doctor and eye doctor once per year.   2. See the Dentist twice per year.   3. Make sure half of your plate is vegetables. This strategy is called MyPlate.   4. Get 150 minutes of exercise (where you are out of breath) per week.

## 2021-08-24 NOTE — PROGRESS NOTES
"Assessment & Plan   Plantar warts  History and exam consistent with a common wart today. Cryotherapy was done in the usual fashion. Pt will use Mediplast at home in the meantime. RETURN TO CLINIC in 2-4 weeks for repeat cryotherapy treatment.     Need for vaccination  Updated TDAP vaccine today.   - TDAP VACCINE (Adacel, Boostrix)  [[id number]]    CARDIOVASCULAR SCREENING; LDL GOAL LESS THAN 130  Due for screening. Ordered today.   - Lipid panel reflex to direct LDL Non-fasting; Future    Screening for diabetes mellitus  Body mass index is 32.05 kg/m . A1c ordered. Encouraged healthy lifestyle changes.   - Hemoglobin A1c; Future    BMI:   Estimated body mass index is 32.05 kg/m  as calculated from the following:    Height as of this encounter: 1.88 m (6' 2\").    Weight as of this encounter: 113.2 kg (249 lb 9.6 oz).   Weight management plan: Discussed healthy diet and exercise guidelines      Return in about 1 year (around 8/24/2022) for In-Clinic Visit.    Mukesh Carlson PA-C  [facility]    Sammy Stephenson is a 35 year old who presents for the following health issues     HPI   Biometric Screening  -Says that his employer has some one come in for the biometric screening. Says that he did not get scheduled soon enough to get his done so thought he would do it on his own.      Warts  Onset/Duration: 5-10 years   Description (location/number): bottom of left foot   Accompanying signs and symptoms (pain, redness): no  History: prior warts: YES  Therapies tried and outcome: non    Review of Systems   See HPi       Objective    /70 (BP Location: Right arm, Patient Position: Sitting, Cuff Size: Adult Large)   Pulse 74   Temp 97.6  F (36.4  C) (Tympanic)   Resp 18   Ht 1.88 m (6' 2\")   Wt 113.2 kg (249 lb 9.6 oz)   BMI 32.05 kg/m    Body mass index is 32.05 kg/m .  Physical Exam   Constitutional: healthy, alert, and no distress  Head: Normocephalic. Atraumatic  Eyes: No conjunctival " injection, sclera anicteric  Neck: supple, no thyromegaly, nodules or asymmetry of the thyroid. No cervical LAD.  Cardiovascular: RRR. No murmurs, clicks, gallops, or rubs. No peripheral edema.   Respiratory: No resp distress. Lungs CTAB bilaterally.   Musculoskeletal: extremities normal- no gross deformities noted, and normal muscle tone  Skin: no suspicious lesions or rashes  Neurologic: Gait normal. CN 2-12 grossly intact  Psychiatric: mentation appears normal and affect normal/bright  Skin: 1 wart, plantar left lateral foot.     Procedure Note (Cryotherapy):   All lesions are frozen with LN2 x3. Patient tolerated procedure well.

## 2021-08-25 ASSESSMENT — ANXIETY QUESTIONNAIRES: GAD7 TOTAL SCORE: 12

## 2021-09-29 ENCOUNTER — E-VISIT (OUTPATIENT)
Dept: URGENT CARE | Facility: URGENT CARE | Age: 36
End: 2021-09-29
Payer: COMMERCIAL

## 2021-09-29 DIAGNOSIS — Z20.822 CLOSE EXPOSURE TO 2019 NOVEL CORONAVIRUS: Primary | ICD-10-CM

## 2021-09-29 PROCEDURE — 99421 OL DIG E/M SVC 5-10 MIN: CPT | Performed by: PHYSICIAN ASSISTANT

## 2021-09-29 NOTE — PATIENT INSTRUCTIONS
"  Dear Mukesh Wolff,    Based on your exposure to COVID-19 (coronavirus), we would like to test you for this virus. I have placed an order for this test.The best time for testing is 5-7 days after the exposure.    How to schedule:  Go to your [employer] home page and scroll down to the section that says  You have an appointment that needs to be scheduled  and click the large green button that says  Schedule Now  and follow the steps to find the next available opening.     If you are unable to complete these [employer] scheduling steps, please call [phone] to schedule your testing.     Return to work/school/ guidance:   For people with high risk exposures outside the home    Please let your workplace manager and staffing office know when your quarantine ends.     We can not give you an exact date as it depends on the information below. You can calculate this on your own or work with your manager/staffing office to calculate this. (For example if you were exposed on 10/4, you would have to quarantine for 14 full days. That would be through 10/18. You could return on 10/19.)    Quarantine Guidelines:  Patients (\"contacts\") who have been in close prolonged contact of an infected person(s) (within six feet for at least 15 minutes within a 24 hour period), and remain asymptomatic should enter quarantine based on the following options:    14-day quarantine period (this remains the CDC recommendation for the greatest protection against spread of COVID-19) OR    Minimum 7-day quarantine with negative RT-PCR test collected on day 5 or later OR    10-day quarantine with no test  Quarantine Guideline exceptions are as follows:    People who have been fully vaccinated do not need to quarantine if the exposure was at least 2 weeks after the last vaccination. This includes vaccinated health care workers.    Not fully vaccinated and unvaccinated Individuals who work in health care, congregate care, or congregate living " should be off work for 14 days from their last date of exposure. Community activities for this group can be resumed based on options above. Fully vaccinated individuals in this group do not need to quarantine from work after exposure.    Not fully vaccinated and unvaccinated people whose high-risk exposure was a household member should always quarantine for 14 days from their last date of exposure. Fully vaccinated people in this category do not need to quarantine.    Not fully vaccinated or unvaccinated residents of congregate care and congregate living settings should always quarantine for 14 days from their last date of exposure. Fully vaccinated residents do not need to quarantine.  Note: If you have ongoing exposure to the covid positive person, this quarantine period may be more than 14 days. (For example, if you are continued to be exposed to your child who tested positive and cannot isolate from them, then the quarantine of 7-14 days can't start until your child is no longer contagious. This is typically 10 days from onset of the child's symptoms. So the total duration may be 17-24 days in this case.)    You should continue symptom monitoring until day 14 post-exposure. If you develop signs or symptoms of COVID-19, isolate and get tested (even if you have been tested already).    How to quarantine:   Stay home and away from others. Don't go to school or anywhere else. Generally quarantine means staying home from work but there are some exceptions to this. Please contact your workplace.  No hugging, kissing or shaking hands.  Don't let anyone visit.  Cover your mouth and nose with a mask, tissue or washcloth to avoid spreading germs.  Wash your hands and face often. Use soap and water.    What are the symptoms of COVID-19?  The most common symptoms are cough, fever and trouble breathing. Less common symptoms include headache, body aches, fatigue (feeling very tired), chills, sore throat, stuffy or runny nose,  diarrhea (loose poop), loss of taste or smell, belly pain, and nausea or vomiting (feeling sick to your stomach or throwing up).  After 14 days, if you have still don't have symptoms, you likely don't have this virus.  If you develop symptoms, follow these guidelines.  If you're normally healthy: Please start another eVisit.  If you have a serious health problem (like cancer, heart failure, an organ transplant or kidney disease): Call your specialty clinic. Let them know that you might have COVID-19.    Where can I get more information?  [facility] - About COVID-19: [email or web address]  CDC - What to Do If You're Sick: www.cdc.gov/coronavirus/2019-ncov/about/steps-when-sick.html  CDC - Ending Home Isolation: www.cdc.gov/coronavirus/2019-ncov/hcp/disposition-in-home-patients.html  CDC - Caring for Someone: www.cdc.gov/coronavirus/2019-ncov/if-you-are-sick/care-for-someone.html  [facility] clinical trials (COVID-19 research studies): clinicalaffairs.[email or web address]-clinical-trials  Below are the COVID-19 hotlines at the [facility] ([facility]). Interpreters are available.  For health questions: Call 305-769-2258 or 1-714.871.6717 (7 a.m. to 7 p.m.)  For questions about schools and childcare: Call 222-897-0223 or 1-308.912.8859 (7 a.m. to 7 p.m.)        September 29, 2021  RE:  Mukesh Wolff                                                                                                                   [address] [facility] [address] [facility] [address]      To whom it may concern:    I evaluated Mukesh Wolff on September 29, 2021. Mukesh DOREEN New should be excused from work/school.    They should let their workplace manager and staffing office know when their quarantine ends.    We can not give an exact date as it depends on the information below. They can calculate this on their own or work with their manager/staffing office to calculate this. (For example if they were exposed on  "10/04, they would have to quarantine for 14 full days. That would be through 10/18. They could return on 10/19.)    Quarantine Guidelines:    Patients (\"contacts\") who have been in close prolonged contact of an infected person(s) (within six feet for at least 15 minutes within a 24 hour period) and remain asymptomatic should enter quarantine based on the following options:      14-day quarantine period (this remains the CDC recommendation for the greatest protection against spread of COVID-19) OR    Minimum 7-day quarantine with negative RT-PCR test collected on day 5 or later OR    10-day quarantine with no test   Quarantine Guideline exceptions are as follows:    People who have been fully vaccinated do not need to quarantine if the exposure was at least 2 weeks after the last vaccination. This includes vaccinated health care workers.    Not fully vaccinated and unvaccinated Individuals who work in health care, congregate care, or congregate living should be off work for 14 days from their last date of exposure. Community activities for this group can be resumed based on options above. Fully vaccinated individuals in this group do not need to quarantine from work after exposure.    Not fully vaccinated and unvaccinated people whose high-risk exposure was a household member should always quarantine for 14 days from their last date of exposure. Fully vaccinated people in this category do not need to quarantine.    Not fully vaccinated or unvaccinated residents of congregate care and congregate living settings should always quarantine for 14 days from their last date of exposure. Fully vaccinated residents do not need to quarantine.    Note: If there is ongoing exposure to the covid positive person, this quarantine period may be longer than 14 days. (For example, if they are continually exposed to their child, who tested positive and cannot isolate from them, then the quarantine of 7-14 days can't start until their " child is no longer contagious. This is typically 10 days from onset to the child's symptoms. So the total duration may be 17-24 days in this case.)    Mukesh Wolff should continue symptom monitoring until day 14 post-exposure. If they develop signs or symptoms of COVID-19, they should isolate and get tested (even if they have been tested already).    Sincerely,  Remedios Tate PA-C

## 2021-10-07 ENCOUNTER — LAB (OUTPATIENT)
Dept: LAB | Facility: CLINIC | Age: 36
End: 2021-10-07
Attending: PHYSICIAN ASSISTANT
Payer: COMMERCIAL

## 2021-10-07 DIAGNOSIS — Z20.822 CLOSE EXPOSURE TO 2019 NOVEL CORONAVIRUS: ICD-10-CM

## 2021-10-07 PROCEDURE — U0005 INFEC AGEN DETEC AMPLI PROBE: HCPCS

## 2021-10-07 PROCEDURE — U0003 INFECTIOUS AGENT DETECTION BY NUCLEIC ACID (DNA OR RNA); SEVERE ACUTE RESPIRATORY SYNDROME CORONAVIRUS 2 (SARS-COV-2) (CORONAVIRUS DISEASE [COVID-19]), AMPLIFIED PROBE TECHNIQUE, MAKING USE OF HIGH THROUGHPUT TECHNOLOGIES AS DESCRIBED BY CMS-2020-01-R: HCPCS

## 2021-10-08 LAB — SARS-COV-2 RNA RESP QL NAA+PROBE: NEGATIVE

## 2021-10-19 PROBLEM — F32.9 MAJOR DEPRESSION: Status: ACTIVE | Noted: 2019-06-24

## 2021-10-23 ENCOUNTER — HEALTH MAINTENANCE LETTER (OUTPATIENT)
Age: 36
End: 2021-10-23

## 2022-02-12 ENCOUNTER — HEALTH MAINTENANCE LETTER (OUTPATIENT)
Age: 37
End: 2022-02-12

## 2022-03-16 ENCOUNTER — LAB (OUTPATIENT)
Dept: FAMILY MEDICINE | Facility: CLINIC | Age: 37
End: 2022-03-16
Payer: COMMERCIAL

## 2022-03-16 DIAGNOSIS — Z20.822 SUSPECTED COVID-19 VIRUS INFECTION: ICD-10-CM

## 2022-03-16 LAB — SARS-COV-2 RNA RESP QL NAA+PROBE: NEGATIVE

## 2022-03-16 PROCEDURE — U0003 INFECTIOUS AGENT DETECTION BY NUCLEIC ACID (DNA OR RNA); SEVERE ACUTE RESPIRATORY SYNDROME CORONAVIRUS 2 (SARS-COV-2) (CORONAVIRUS DISEASE [COVID-19]), AMPLIFIED PROBE TECHNIQUE, MAKING USE OF HIGH THROUGHPUT TECHNOLOGIES AS DESCRIBED BY CMS-2020-01-R: HCPCS

## 2022-03-16 PROCEDURE — U0005 INFEC AGEN DETEC AMPLI PROBE: HCPCS

## 2022-05-27 ENCOUNTER — OFFICE VISIT (OUTPATIENT)
Dept: FAMILY MEDICINE | Facility: CLINIC | Age: 37
End: 2022-05-27
Payer: COMMERCIAL

## 2022-05-27 VITALS
OXYGEN SATURATION: 95 % | DIASTOLIC BLOOD PRESSURE: 82 MMHG | BODY MASS INDEX: 33.11 KG/M2 | HEART RATE: 88 BPM | RESPIRATION RATE: 20 BRPM | SYSTOLIC BLOOD PRESSURE: 124 MMHG | WEIGHT: 258 LBS | HEIGHT: 74 IN | TEMPERATURE: 97.6 F

## 2022-05-27 DIAGNOSIS — J30.2 SEASONAL ALLERGIC RHINITIS, UNSPECIFIED TRIGGER: ICD-10-CM

## 2022-05-27 DIAGNOSIS — F32.1 CURRENT MODERATE EPISODE OF MAJOR DEPRESSIVE DISORDER, UNSPECIFIED WHETHER RECURRENT (H): ICD-10-CM

## 2022-05-27 DIAGNOSIS — F41.1 GAD (GENERALIZED ANXIETY DISORDER): ICD-10-CM

## 2022-05-27 DIAGNOSIS — Z00.00 ROUTINE GENERAL MEDICAL EXAMINATION AT A HEALTH CARE FACILITY: Primary | ICD-10-CM

## 2022-05-27 DIAGNOSIS — E70.1 PHENYLKETONURIA (PKU) (H): ICD-10-CM

## 2022-05-27 PROCEDURE — 99214 OFFICE O/P EST MOD 30 MIN: CPT | Mod: 25 | Performed by: NURSE PRACTITIONER

## 2022-05-27 PROCEDURE — 99395 PREV VISIT EST AGE 18-39: CPT | Performed by: NURSE PRACTITIONER

## 2022-05-27 RX ORDER — LORATADINE 10 MG/1
10 TABLET ORAL DAILY
COMMUNITY

## 2022-05-27 RX ORDER — PEGVALIASE-PQPZ 20 MG/ML
INJECTION, SOLUTION SUBCUTANEOUS
Qty: 224 ML | Refills: 1 | COMMUNITY
Start: 2022-05-27

## 2022-05-27 RX ORDER — ESCITALOPRAM OXALATE 10 MG/1
TABLET ORAL
Qty: 49 TABLET | Refills: 0 | Status: SHIPPED | OUTPATIENT
Start: 2022-05-27 | End: 2022-06-21

## 2022-05-27 ASSESSMENT — PATIENT HEALTH QUESTIONNAIRE - PHQ9
10. IF YOU CHECKED OFF ANY PROBLEMS, HOW DIFFICULT HAVE THESE PROBLEMS MADE IT FOR YOU TO DO YOUR WORK, TAKE CARE OF THINGS AT HOME, OR GET ALONG WITH OTHER PEOPLE: VERY DIFFICULT
SUM OF ALL RESPONSES TO PHQ QUESTIONS 1-9: 10
SUM OF ALL RESPONSES TO PHQ QUESTIONS 1-9: 10

## 2022-05-27 ASSESSMENT — ANXIETY QUESTIONNAIRES
2. NOT BEING ABLE TO STOP OR CONTROL WORRYING: MORE THAN HALF THE DAYS
3. WORRYING TOO MUCH ABOUT DIFFERENT THINGS: NEARLY EVERY DAY
GAD7 TOTAL SCORE: 12
7. FEELING AFRAID AS IF SOMETHING AWFUL MIGHT HAPPEN: MORE THAN HALF THE DAYS
1. FEELING NERVOUS, ANXIOUS, OR ON EDGE: MORE THAN HALF THE DAYS
4. TROUBLE RELAXING: SEVERAL DAYS
GAD7 TOTAL SCORE: 12
7. FEELING AFRAID AS IF SOMETHING AWFUL MIGHT HAPPEN: MORE THAN HALF THE DAYS
8. IF YOU CHECKED OFF ANY PROBLEMS, HOW DIFFICULT HAVE THESE MADE IT FOR YOU TO DO YOUR WORK, TAKE CARE OF THINGS AT HOME, OR GET ALONG WITH OTHER PEOPLE?: SOMEWHAT DIFFICULT
GAD7 TOTAL SCORE: 12
5. BEING SO RESTLESS THAT IT IS HARD TO SIT STILL: SEVERAL DAYS
6. BECOMING EASILY ANNOYED OR IRRITABLE: SEVERAL DAYS

## 2022-05-27 ASSESSMENT — ENCOUNTER SYMPTOMS
ABDOMINAL PAIN: 0
SHORTNESS OF BREATH: 1
HEMATURIA: 0
ARTHRALGIAS: 1
DIARRHEA: 0
EYE PAIN: 0
DYSURIA: 0
MYALGIAS: 0
DIZZINESS: 0
HEARTBURN: 0
JOINT SWELLING: 0
CONSTIPATION: 0
NERVOUS/ANXIOUS: 1
HEMATOCHEZIA: 0
FREQUENCY: 0
FEVER: 0
NAUSEA: 0
SORE THROAT: 1
COUGH: 0
WEAKNESS: 0
HEADACHES: 1
PARESTHESIAS: 0
PALPITATIONS: 0
CHILLS: 0

## 2022-05-27 NOTE — PROGRESS NOTES
SUBJECTIVE:   CC: Mukesh Wolff is an 36 year old male who presents for preventative health visit.       Patient has been advised of split billing requirements and indicates understanding: Yes  Healthy Habits:     Getting at least 3 servings of Calcium per day:  NO    Bi-annual eye exam:  Yes    Dental care twice a year:  Yes    Sleep apnea or symptoms of sleep apnea:  Daytime drowsiness    Diet:  Other    Frequency of exercise:  None    Taking medications regularly:  Yes    Medication side effects:  Not applicable    PHQ-2 Total Score: 3    Additional concerns today:  No  Supplemental Calcium, is exhausted at the beginning of the day and the end of the day.  Also after lunch will feel a little dozy also.      Concern - Lymph node swelling  Onset: earlier this week, like Monday or Tuesday    Description:   Swollen node in right anterior cervical, tenderness    Intensity: mild    Progression of Symptoms:  worsening    Accompanying Signs & Symptoms:  Patient felt a pop sensation in his neck last night when he took a drink.  Felt pain in throat and right cheek. Patient had a headache for a couple of hours afterwards.     Previous history of similar problem:   none    Precipitating factors:   Worsened by: none    Alleviating factors:  Improved by: none  Right side started at the top of the mandible and then moved down.  He felt a swallow caused a pain in the face and the pain went away in the neck.  Now has some tenderness on upper cheek.  ymptoms are a little improved.  Hx for allergies and took Claritin regularly and has not taken any.  A couple weeks ago had sinus headache but went away also.    Therapies Tried and outcome: none    Today's PHQ-2 Score:   PHQ-2 ( 1999 Pfizer) 5/27/2022   Q1: Little interest or pleasure in doing things 2   Q2: Feeling down, depressed or hopeless 1   PHQ-2 Score 3   PHQ-2 Total Score (12-17 Years)- Positive if 3 or more points; Administer PHQ-A if positive -   Q1: Little interest or  "pleasure in doing things More than half the days   Q2: Feeling down, depressed or hopeless Several days   PHQ-2 Score 3     Feels that his mental health is \"not okay\".  Has been getting help with therapy in [place].  He has an appointment on June 7th..  Everything seems to stem from his depression.      Abuse: Current or Past(Physical, Sexual or Emotional)- No  Do you feel safe in your environment? Yes    Have you ever done Advance Care Planning? (For example, a Health Directive, POLST, or a discussion with a medical provider or your loved ones about your wishes): No, advance care planning information given to patient to review.  Patient declined advance care planning discussion at this time.    Social History     Tobacco Use     Smoking status: Former Smoker     Types: Cigarettes     Smokeless tobacco: Former User     Types: Chew     Tobacco comment: smoked for 7 years; smoked 1/2 ppd   Substance Use Topics     Alcohol use: Yes     Comment: weekends     If you drink alcohol do you typically have >3 drinks per day or >7 drinks per week? No    Alcohol Use 5/27/2022   Prescreen: >3 drinks/day or >7 drinks/week? Yes   Prescreen: >3 drinks/day or >7 drinks/week? -   AUDIT SCORE  12       Last PSA: No results found for: PSA    Reviewed orders with patient. Reviewed health maintenance and updated orders accordingly - Yes  Labs reviewed in EPIC  BP Readings from Last 3 Encounters:   05/27/22 124/82   08/24/21 120/70   02/20/20 122/79    Wt Readings from Last 3 Encounters:   05/27/22 117 kg (258 lb)   08/24/21 113.2 kg (249 lb 9.6 oz)   02/20/20 104.3 kg (230 lb)            Patient Active Problem List   Diagnosis     PKU (phenylketonuria) (H)     Kidney stones     Juvenile seronegative polyarthritis (H)     Mild major depression (H)     History reviewed. No pertinent surgical history.    Social History     Tobacco Use     Smoking status: Former Smoker     Types: Cigarettes     Smokeless tobacco: Former User     Types: " Chew     Tobacco comment: smoked for 7 years; smoked 1/2 ppd   Substance Use Topics     Alcohol use: Yes     Comment: weekends     Family History   Problem Relation Age of Onset     Rheumatoid Arthritis Mother      Rheumatoid Arthritis Maternal Grandmother      Cerebrovascular Disease Paternal Grandfather      Diabetes Paternal Uncle          Current Outpatient Medications   Medication Sig Dispense Refill     Acetaminophen (TYLENOL PO) PRN       escitalopram (LEXAPRO) 10 MG tablet Take 1 tablet (10 mg) by mouth daily for 7 days, THEN 2 tablets (20 mg) daily for 21 days. 49 tablet 0     IBUPROFEN PO PRN       loratadine (CLARITIN) 10 MG tablet Take 10 mg by mouth daily       Nutritional Supplements (GLYTACTIN RTD 15) LIQD Take 4 Packages by mouth daily 91810 mL 12     PALYNZIQ 20 MG/ML injection Inject as directed 224 mL 1     No Known Allergies    Reviewed and updated as needed this visit by clinical staff   Tobacco  Allergies    Med Hx  Surg Hx  Fam Hx  Soc Hx        Reviewed and updated as needed this visit by Provider   Tobacco  Allergies    Med Hx  Surg Hx  Fam Hx  Soc Hx         Past Medical History:   Diagnosis Date     Depressive disorder      BRADEN (generalized anxiety disorder)      JRA (juvenile rheumatoid arthritis) (H)      Phenylketonuria (PKU) (H)      PKU (phenylketonuria) (H)       History reviewed. No pertinent surgical history.    Review of Systems   Constitutional: Negative for chills and fever.   HENT: Positive for sore throat. Negative for congestion, ear pain and hearing loss.    Eyes: Negative for pain and visual disturbance.   Respiratory: Positive for shortness of breath. Negative for cough.    Cardiovascular: Negative for chest pain, palpitations and peripheral edema.   Gastrointestinal: Negative for abdominal pain, constipation, diarrhea, heartburn, hematochezia and nausea.   Genitourinary: Negative for dysuria, frequency, genital sores, hematuria, impotence, penile discharge and  "urgency.   Musculoskeletal: Positive for arthralgias. Negative for joint swelling and myalgias.   Skin: Negative for rash.   Neurological: Positive for headaches. Negative for dizziness, weakness and paresthesias.   Psychiatric/Behavioral: Negative for mood changes. The patient is nervous/anxious.      Shortness of breath due to anxiety or increase in weight increase.  Hx of JRA with increase in symptoms due to weather.  Sinus headache but improvement.  Having issues with depression and anxiety.  Has been on Buspar in the past but feels depression is the problem.    OBJECTIVE:   /82 (BP Location: Right arm, Patient Position: Sitting, Cuff Size: Adult Large)   Pulse 88   Temp 97.6  F (36.4  C) (Tympanic)   Resp 20   Ht 1.886 m (6' 2.25\")   Wt 117 kg (258 lb)   SpO2 95%   BMI 32.90 kg/m      Physical Exam  GENERAL: healthy, alert and no distress  EYES: Eyes grossly normal to inspection, PERRL and conjunctivae and sclerae normal  HENT: ear canals and TM's normal, nose and mouth without ulcers or lesions; mild redness in posterior pharyngeal   NECK: no adenopathy, no asymmetry, masses, or scars and thyroid normal to palpation  RESP: lungs clear to auscultation - no rales, rhonchi or wheezes  CV: regular rate and rhythm, normal S1 S2, no S3 or S4, no murmur, click or rub, no peripheral edema and peripheral pulses strong  ABDOMEN: soft, nontender, no hepatosplenomegaly, no masses and bowel sounds normal  MS: no gross musculoskeletal defects noted, no edema  SKIN: no suspicious lesions or rashes  NEURO: Normal strength and tone, mentation intact and speech normal  BACK: no CVA tenderness, no paralumbar tenderness  PSYCH: mentation appears normal, affect normal/bright  LYMPH: no cervical adenopathy    ASSESSMENT/PLAN:   (Z00.00) Routine general medical examination at a health care facility  (primary encounter diagnosis)  Comment: No labs needed today since they were normal in the last 9 months.  Patient is due " "for Booster COVID 19 but wants to postpone due to holiday weekend and will make appointment with nursing at a later time.  Follow-up in 1 year for preventative exam.  Plan: REVIEW OF HEALTH MAINTENANCE PROTOCOL ORDERS,     (E70.1) Phenylketonuria (PKU) (H)  Comment: Stable on Palynziq injections.  Follows with genetics at [place].  Plan: PALYNZIQ 20 MG/ML injection    (F32.1) Current moderate episode of major depressive disorder, unspecified whether recurrent (H)  Comment: Starting Lexapro 10 mg for 1 week and increasing to 20 mg for 3 weeks and follow-up in 4 weeks to see how this is helping symptoms.  He is getting counseling arranged also.  Discussed exercise as being helpful as well.  If any side effects he can stop the medication and follow-up in clinic.  Plan: escitalopram (LEXAPRO) 10 MG tablet    (F41.1) BRADEN (generalized anxiety disorder)  Comment: See note above.  Plan: escitalopram (LEXAPRO) 10 MG tablet    (J30.2) Seasonal allergic rhinitis, unspecified trigger  Comment: Sore throat is most likely allergies with no other symptoms.  I recommend restarting Claritin daily to see if this improves symptoms.    Patient has been advised of split billing requirements and indicates understanding: Yes    COUNSELING:   Reviewed preventive health counseling, as reflected in patient instructions       Regular exercise       Healthy diet/nutrition       Vision screening       Immunizations    Due for COVID 19 testing    Estimated body mass index is 32.9 kg/m  as calculated from the following:    Height as of this encounter: 1.886 m (6' 2.25\").    Weight as of this encounter: 117 kg (258 lb).     Weight management plan: Discussed healthy diet and exercise guidelines    He reports that he has quit smoking. His smoking use included cigarettes. He has quit using smokeless tobacco.  His smokeless tobacco use included chew.      Sachi Taylor NP  [facility]  "

## 2022-05-27 NOTE — PATIENT INSTRUCTIONS
Make appointment with nursing for COVID booster when ready.  Follow-up in 1 year for preventative exam.  Start Claritin daily to see if this improves throat.  Start Lexapro 10 mg for 1 week, then increase to 20 mg for 21 days and follow-up in clinic prior to running out for recheck.    Preventive Health Recommendations  Male Ages 26 - 39    Yearly exam:             See your health care provider every year in order to  o   Review health changes.   o   Discuss preventive care.    o   Review your medicines if your doctor has prescribed any.  You should be tested each year for STDs (sexually transmitted diseases), if you re at risk.   After age 35, talk to your provider about cholesterol testing. If you are at risk for heart disease, have your cholesterol tested at least every 5 years.   If you are at risk for diabetes, you should have a diabetes test (fasting glucose).  Shots: Get a flu shot each year. Get a tetanus shot every 10 years.     Nutrition:  Eat at least 5 servings of fruits and vegetables daily.   Eat whole-grain bread, whole-wheat pasta and brown rice instead of white grains and rice.   Get adequate Calcium and Vitamin D.     Lifestyle  Exercise for at least 150 minutes a week (30 minutes a day, 5 days a week). This will help you control your weight and prevent disease.   Limit alcohol to one drink per day.   No smoking.   Wear sunscreen to prevent skin cancer.   See your dentist every six months for an exam and cleaning.

## 2022-06-21 ENCOUNTER — TELEPHONE (OUTPATIENT)
Dept: FAMILY MEDICINE | Facility: CLINIC | Age: 37
End: 2022-06-21
Payer: COMMERCIAL

## 2022-06-21 DIAGNOSIS — F41.1 GAD (GENERALIZED ANXIETY DISORDER): ICD-10-CM

## 2022-06-21 DIAGNOSIS — F32.1 CURRENT MODERATE EPISODE OF MAJOR DEPRESSIVE DISORDER, UNSPECIFIED WHETHER RECURRENT (H): ICD-10-CM

## 2022-06-21 RX ORDER — ESCITALOPRAM OXALATE 10 MG/1
TABLET ORAL
Qty: 49 TABLET | Refills: 0 | Status: SHIPPED | OUTPATIENT
Start: 2022-06-21 | End: 2022-07-18

## 2022-06-21 NOTE — TELEPHONE ENCOUNTER
"Requested Prescriptions   Pending Prescriptions Disp Refills     escitalopram (LEXAPRO) 10 MG tablet 49 tablet 0     Sig: Take 1 tablet (10 mg) by mouth daily for 7 days, THEN 2 tablets (20 mg) daily for 21 days.       SSRIs Protocol Failed - 6/21/2022 10:18 AM        Failed - PHQ-9 score less than 5 in past 6 months     Please review last PHQ-9 score.           Passed - Medication is active on med list        Passed - Patient is age 18 or older        Passed - Recent (6 mo) or future (30 days) visit within the authorizing provider's specialty     Patient had office visit in the last 6 months or has a visit in the next 30 days with authorizing provider or within the authorizing provider's specialty.  See \"Patient Info\" tab in inbasket, or \"Choose Columns\" in Meds & Orders section of the refill encounter.                 "

## 2022-06-21 NOTE — TELEPHONE ENCOUNTER
Refilled for 1 month.  Please call patient and make appointment for further refills.  Sachi Taylor NP on 6/21/2022 at 1:46 PM

## 2022-07-07 NOTE — PROGRESS NOTES
"Mukesh Wolff is a 33 year old male who is being evaluated via a billable telephone visit.      The patient has been notified of following:     \"This telephone visit will be conducted via a call between you and your physician/provider. We have found that certain health care needs can be provided without the need for a physical exam.  This service lets us provide the care you need with a short phone conversation.  If a prescription is necessary we can send it directly to your pharmacy.  If lab work is needed we can place an order for that and you can then stop by our lab to have the test done at a later time.    If during the course of the call the physician/provider feels a telephone visit is not appropriate, you will not be charged for this service.\"     Consent has been obtained for this service by 1 care team member: yes. See the scanned image in the medical record.    Mukesh Wolff complains of    Chief Complaint   Patient presents with     Anxiety     Medication Request       I have reviewed and updated the patient's Past Medical History, Social History, Family History and Medication List.    ALLERGIES  Patient has no known allergies.   TUCKER Reyes MA     (MA signature)    Additional provider notes: Depression or Anxiety - New Diagnosis   He is taking Buspar 15 mg BID  He finds this medication helpful  BRADEN-7 SCORE 6/24/2019 8/27/2019   Total Score 14 1   he does have nausea after taking this medication, especially if he forgets to take with food  He does not feel depressed  He has a lot of anxiety around eating and his diet and PKU  He is starting a new medication soon, Palynziq and hopeful that it help allow him to eat a regular diet  He wonders if he will be less anxious if the medication is effective and he doesn't have to worry so much about his diet  He is not seeing a therapist  He was going to check into the [employer] at his work but he is not sure if he can have free sessions as he used them last " PULMONARY CONSULTATION    ADMISSION DATE:  7/5/2022  CONSULTING PHYSICIAN: Anabell Cerna. MD Shane  ATTENDING PHYSICIAN:  Mitra Ornelas MD  CODE STATUS:  Do Not Resuscitate    SUBJECTIVE:  59-year-old woman who follows with our practice for COPD and chronic hypoxemic respiratory failure on home oxygen 3 LPM.  Patient came to the ER complaining of fever sore throat body aches and cough. Her breathing is at his baseline. Prior to admission her temperature was 100.3. She tested positive for COVID-19. Since since admission she has not had any new complaints. She denies any GI symptoms    Interval history-Hospital course reviewed and discussed with the patient's nurse. Patient feels better today. She has a minor cough and still has some dyspnea when she walks. She no longer feels feverish and her body aches are better than yesterday.   HISTORIES:  Past Medical History:   Diagnosis Date   â¢ Abdominal tumor    â¢ Anemia, iron deficiency    â¢ Compression fracture of vertebra (CMS/HCC)    â¢ COPD (chronic obstructive pulmonary disease) (CMS/HCC)    â¢ COVID-19    â¢ COVID-19    â¢ Emphysema of lung (CMS/HCC)    â¢ Gastric ulcer    â¢ Gastrointestinal stromal tumor (GIST) of duodenum (CMS/HCC)    â¢ Hypertension    â¢ Malignant melanoma (CMS/HCC) 06/2021    Left medial upper back   â¢ Osteoporosis    â¢ Pneumonia    â¢ Pulmonary embolism (CMS/HCC)      Past Surgical History:   Procedure Laterality Date   â¢ Colonoscopy     â¢ Eye surgery     â¢ Hydrodissection us guided cortisone injection of shoulder     â¢ Ivc filter placement     â¢ Remv 2nd cataract,corn-scler sectn     â¢ Skin biopsy     â¢ Skin cancer excision  07/07/2021    Melanoma   â¢ Stent implant     â¢ Tonsillectomy and adenoidectomy     â¢ Upper gi endoscopy,tumor ablatn       ALLERGIES:  No Known Allergies   Social History     Tobacco Use   â¢ Smoking status: Former Smoker     Packs/day: 1.00     Years: 45.00     Pack years: 45.00     Types: Cigarettes     Quit date: 2005     Years since quittin.5   â¢ Smokeless tobacco: Never Used   Substance Use Topics   â¢ Alcohol use:  Yes     Alcohol/week: 7.0 standard drinks     Types: 7 Glasses of wine per week     Comment: 6 oz of wine a day     Family History   Problem Relation Age of Onset   â¢ Heart disease Mother    â¢ Heart disease Father    â¢ Heart disease Sister        Current Facility-Administered Medications   Medication Dose Route Frequency Provider Last Rate Last Admin   â¢ warfarin (COUMADIN) tablet 3 mg  3 mg Oral Once Braeden Castro MD       â¢ ipratropium-albuterol (DUONEB) 0.5-2.5 (3) MG/3ML nebulizer solution 3 mL  3 mL Nebulization 4x Daily Resp Shilpi Hardy MD       â¢ predniSONE (DELTASONE) tablet 40 mg  40 mg Oral Daily with breakfast Shilpi Hardy MD       â¢ lidocaine (LIDOCARE) 4 % patch 1 patch  1 patch Transdermal Daily Shilpi Hardy MD   1 patch at 22 0942   â¢ cefTRIAXone (ROCEPHIN) syringe 1,000 mg  1,000 mg Intravenous Daily Dori Oppenheim, CNP   1,000 mg at 22 1802   â¢ acetaminophen (TYLENOL) tablet 650 mg  650 mg Oral Q4H PRN Dori Oppenheim, CNP   650 mg at 22 1406   â¢ albuterol inhaler 2 puff  2 puff Inhalation Q4H Resp PRN Dori Oppenheim, CNP       â¢ ALPRAZolam (XANAX) tablet 0.25 mg  0.25 mg Oral Nightly PRN Dori Oppenheim, CNP       â¢ docusate sodium (COLACE) capsule 100 mg  100 mg Oral Daily PRN Dori Oppenheim, CNP   100 mg at 22 2136   â¢ ferrous sulfate (65 mg Fe per 325 mg) tablet 325 mg  325 mg Oral Daily Sarah Rangel CNP   325 mg at 22 0942   â¢ losartan-hydroCHLOROthiazide 100-25 mg   Oral Daily Dori Oppenheim, CNP   Given at 22 0941   â¢ WARFARIN - PHARMACIST MONITORED Misc   Does not apply See Admin Instructions Dori Oppenheim, CNP       â¢ HYDROcodone-acetaminophen (NORCO) 5-325 MG per tablet 1 tablet  1 tablet Oral Q4H PRN Dori Oppenheim, CNP   1 tablet at 22 2256   â¢ sodium chloride 0.9 % flush bag 25 mL  25 mL Intravenous PRN year      Assessment/Plan:  Mild major depression (H)  (primary encounter diagnosis)  Generalized anxiety disorder  PKU (phenylketonuria) (H)    Buspar effective, but causing GI side effects  We discussed decreasing dose to see if 10 mg BID is still effective, but better tolerated  Avoid serotonin specific reuptake inhibitor as he is worried about sexual side effects which he has had in the past  Consider SNRI (venlafaxine) and advised it would be important to not miss doses  Ultimately, we decided to decrease to 10 mg BID  If SE don't resolve, or if this dosing is not therapeutic, will try venlafaxine  He can call clinic or send me a [employer] message with an update    I have reviewed the note as documented above.  This accurately captures the substance of my conversation with the patient.  Coleen Card CNP    Total time of call between patient and provider was 15 minutes     MARIELLE Rehman CNP     Kumar Em CNP       â¢ sodium chloride (PF) 0.9 % injection 2 mL  2 mL Intracatheter 2 times per day Kumar Em CNP   2 mL at 07/07/22 0942   â¢ sodium chloride 0.9% infusion   Intravenous Continuous Kumar Em CNP   Completed at 07/06/22 1104   â¢ ondansetron (ZOFRAN) injection 4 mg  4 mg Intravenous Q6H PRN Kumar Em CNP           REVIEW OF SYSTEMS:    Review of Systems   Constitutional: Positive for chills, fatigue and fever. Negative for diaphoresis and unexpected weight change. HENT: Negative for congestion, ear pain, hearing loss, mouth sores, sinus pain, sore throat, trouble swallowing and voice change. Eyes: Negative for pain and visual disturbance. Respiratory: Negative for chest tightness. Cardiovascular: Negative for chest pain, palpitations and leg swelling. Gastrointestinal: Negative for abdominal distention, abdominal pain, blood in stool, diarrhea, nausea and vomiting. Endocrine: Negative for heat intolerance, polydipsia and polyuria. Genitourinary: Negative for difficulty urinating, dysuria and hematuria. Musculoskeletal: Negative for gait problem and neck stiffness. Skin: Negative for rash. Allergic/Immunologic: Negative for environmental allergies. Neurological: Negative for dizziness, seizures, syncope, weakness and headaches. Hematological: Negative for adenopathy. Does not bruise/bleed easily.         MEDICATIONS:  SCHEDULED MEDS:  Current Facility-Administered Medications   Medication Dose Route Frequency Provider Last Rate Last Admin   â¢ warfarin (COUMADIN) tablet 3 mg  3 mg Oral Once Tanner Aragon MD       â¢ ipratropium-albuterol (DUONEB) 0.5-2.5 (3) MG/3ML nebulizer solution 3 mL  3 mL Nebulization 4x Daily Ahsan Nicole MD       â¢ predniSONE (DELTASONE) tablet 40 mg  40 mg Oral Daily with breakfast Lorenzo Syed MD       â¢ lidocaine (LIDOCARE) 4 % patch 1 patch  1 patch Transdermal Daily Lorenzo Syed MD   1 patch at 07/07/22 0942   â¢ cefTRIAXone (ROCEPHIN) syringe 1,000 mg  1,000 mg Intravenous Daily [facility]   1,000 mg at 07/06/22 1802   â¢ ferrous sulfate (65 mg Fe per 325 mg) tablet 325 mg  325 mg Oral Daily [facility] Oppenheim, CNP   325 mg at 07/07/22 8212   â¢ losartan-hydroCHLOROthiazide 100-25 mg   Oral Daily Lamar Oppenheim, CNP   Given at 07/07/22 0941   â¢ WARFARIN - PHARMACIST MONITORED Misc   Does not apply See Admin Instructions Dori Oppenheim, CNP       â¢ sodium chloride (PF) 0.9 % injection 2 mL  2 mL Intracatheter 2 times per day [facility] Oppenheim, CNP   2 mL at 07/07/22 0942       CONTINUOUS INFUSIONS:  Current Facility-Administered Medications   Medication Dose Route Frequency Provider Last Rate Last Admin   â¢ sodium chloride 0.9% infusion   Intravenous Continuous Dori Oppenheim, CNP   Completed at 07/06/22 1104       PRN MEDS:  Current Facility-Administered Medications   Medication Dose Route Frequency Provider Last Rate Last Admin   â¢ acetaminophen (TYLENOL) tablet 650 mg  650 mg Oral Q4H PRN [facility] Oppenheim, CNP   650 mg at 07/07/22 1406   â¢ albuterol inhaler 2 puff  2 puff Inhalation Q4H Resp PRN Lamar Oppenheim, CNP       â¢ ALPRAZolam Lizeth Qian) tablet 0.25 mg  0.25 mg Oral Nightly PRN Dori Oppenheim, CNP       â¢ docusate sodium (COLACE) capsule 100 mg  100 mg Oral Daily PRN Lamar Oppenheim, CNP   100 mg at 07/06/22 2136   â¢ HYDROcodone-acetaminophen (NORCO) 5-325 MG per tablet 1 tablet  1 tablet Oral Q4H PRN Dori Oppenheim, CNP   1 tablet at 07/05/22 2256   â¢ sodium chloride 0.9 % flush bag 25 mL  25 mL Intravenous PRN Lamar Oppenheim, CNP       â¢ ondansetron (ZOFRAN) injection 4 mg  4 mg Intravenous Q6H PRN [facility] Oppenheim, CNP             OBJECTIVE:  VITAL SIGNS:  Vital Last Value 24 Hour Range   Temperature 97.9 Â°F (36.6 Â°C) (07/07/22 1248) Temp  Min: 97.9 Â°F (36.6 Â°C)  Max: 97.9 Â°F (36.6 Â°C)   Pulse 71 (07/07/22 1248) Pulse  Min: 64  Max: 73   Respiratory 20 (07/07/22 1248) Resp "Min: 20  Max: 22   Non-Invasive  Blood Pressure (!) 151/66 (07/07/22 1248) BP  Min: 136/62  Max: 168/70   Pulse Oximetry 97 % (07/07/22 1248) SpO2  Min: 92 %  Max: 97 %     Vital Today Admitted   Weight 80.3 kg (177 lb 0.5 oz) (07/07/22 0600) Weight: 81.6 kg (180 lb) (07/05/22 1410)   Height N/A Height: 5' 5"" (165.1 cm) (07/05/22 1410)   BMI N/A BMI (Calculated): 29.95 (07/05/22 1410)       INTAKE/OUTPUT LAST 3 SHIFTS:  I/O last 3 completed shifts: In: 1208.6 [I.V.:1181.1; IV Piggyback:27.5]  Out: 925 [Urine:925]           PHYSICAL EXAMINATION:  General:  No acute distress. Alert and oriented x3. Skin:  Skin color, texture, and turgor normal.  No rashes   Head:  Normocephalic, , atraumatic. Eyes: Pupils equal and reactive conjunctivae/corneas clear,  Sclera nonicteric  Ears: Normal external ears. Nose:  Nares normal. Septum midline. Mucosa normal. No drainage or sinus tenderness. Throat:  Lips, mucosa, and tongue normal; Normal posterior oropharynx. Neck: Supple,. Trachea midline. Thyroid has no enlargement, . No carotid bruit or JVD. Lungs: Breath sounds equal bilaterally. No wheezing rales or rhonchi  Chest wall:  No tenderness or deformity. Heart[de-identified]  Regular rate and rhythm. No murmur, rub or gallop. Abdomen:  Soft, nontender, bowel sounds active all four quadrants. No masses or hepatomegaly or splenomegaly. Extremities:   , no cyanosis clubbing  or edema. Pulses:  2+ and symmetric all extremities. Lymph Nodes:  Cervical, supraclavicular normal.  Musculoskeletal : No inflammatory arthritis,  Neurologic:  Alert and oriented x 3.   No gross focal deficits    LABORATORY DATA:  Recent Results (from the past 24 hour(s))   Prothrombin Time    Collection Time: 07/07/22  6:13 AM   Result Value Ref Range    Prothrombin Time 14.3 (H) 9.7 - 11.8 sec    INR 1.4     Basic Metabolic Panel    Collection Time: 07/07/22  6:13 AM   Result Value Ref Range    Fasting Status      Sodium 142 135 - 145 mmol/L    " Potassium 3.9 3.4 - 5.1 mmol/L    Chloride 102 97 - 110 mmol/L    Carbon Dioxide 34 (H) 21 - 32 mmol/L    Anion Gap 10 7 - 19 mmol/L    Glucose 99 70 - 99 mg/dL    BUN 18 6 - 20 mg/dL    Creatinine 0.70 0.51 - 0.95 mg/dL    Glomerular Filtration Rate 83 >=60    BUN/ Creatinine Ratio 26 (H) 7 - 25    Calcium 8.8 8.4 - 10.2 mg/dL   Magnesium    Collection Time: 07/07/22  6:13 AM   Result Value Ref Range    Magnesium 1.7 1.7 - 2.4 mg/dL   CBC with Automated Differential (performable only)    Collection Time: 07/07/22  6:13 AM   Result Value Ref Range    WBC 9.1 4.2 - 11.0 K/mcL    RBC 2.97 (L) 4.00 - 5.20 mil/mcL    HGB 8.2 (L) 12.0 - 15.5 g/dL    HCT 27.2 (L) 36.0 - 46.5 %    MCV 91.6 78.0 - 100.0 fl    MCH 27.6 26.0 - 34.0 pg    MCHC 30.1 (L) 32.0 - 36.5 g/dL    RDW-CV 14.1 11.0 - 15.0 %    RDW-SD 47.7 39.0 - 50.0 fL     140 - 450 K/mcL    NRBC 0 <=0 /100 WBC    Neutrophil, Percent 75 %    Lymphocytes, Percent 13 %    Mono, Percent 9 %    Eosinophils, Percent 3 %    Basophils, Percent 0 %    Immature Granulocytes 0 %    Absolute Neutrophils 6.8 1.8 - 7.7 K/mcL    Absolute Lymphocytes 1.1 1.0 - 4.0 K/mcL    Absolute Monocytes 0.8 0.3 - 0.9 K/mcL    Absolute Eosinophils  0.3 0.0 - 0.5 K/mcL    Absolute Basophils 0.0 0.0 - 0.3 K/mcL    Absolute Immmature Granulocytes 0.0 0.0 - 0.2 K/mcL        IMAGING STUDIES:          Poor inspiratory effort. Heart size stable. Calcification of aortic  arch. Triangular shaped soft tissue density left lower lobe abutting the  cardiac fluid could represent of atelectasis and or scarring. No pleural  effusion or pneumothorax. Degenerative changes both shoulders greater on  the right than the left. Osteochondromatosis right shoulder. Old healed  deformity right no detectable fracture cortical erosion or destruction. Â   Â     IMPRESSION:       -Acute exacerbation of chronic hypoxemic respiratory failure.   Currently on 4 LPM.  Baseline O2 requirement is 3 LPM    -Status post fall, with right chest wall pain    -Coagulopathy    -COPD with chronic hypoxemic respiratory failure    -History of SARS 2 COVID-19 infection    -History of hypertension, essential    -Remote history of PE    -Anemia normocytic normochromic    -History of GIST    -Osteoporosis      Recommendations:    -Continue present management    -Supplemental oxygen to correct hypoxemia    -Nebulized bronchodilators and standard doses via updraft nebulizer    -Oral corticosteroids    -Monitor PT/INR    -Assess for respiratory status stability    -Consider discharge in the next 24 hours    -Further recommendations to follow      Sandie Lynn MD, [facility]

## 2022-07-18 ENCOUNTER — OFFICE VISIT (OUTPATIENT)
Dept: FAMILY MEDICINE | Facility: CLINIC | Age: 37
End: 2022-07-18
Payer: COMMERCIAL

## 2022-07-18 VITALS
RESPIRATION RATE: 18 BRPM | DIASTOLIC BLOOD PRESSURE: 74 MMHG | WEIGHT: 252.2 LBS | HEART RATE: 76 BPM | SYSTOLIC BLOOD PRESSURE: 110 MMHG | BODY MASS INDEX: 32.16 KG/M2 | TEMPERATURE: 97.6 F

## 2022-07-18 DIAGNOSIS — F10.10 ALCOHOL CONSUMPTION BINGE DRINKING: ICD-10-CM

## 2022-07-18 DIAGNOSIS — F33.1 MODERATE EPISODE OF RECURRENT MAJOR DEPRESSIVE DISORDER (H): Primary | ICD-10-CM

## 2022-07-18 DIAGNOSIS — F41.1 GAD (GENERALIZED ANXIETY DISORDER): ICD-10-CM

## 2022-07-18 PROCEDURE — 99214 OFFICE O/P EST MOD 30 MIN: CPT | Performed by: PHYSICIAN ASSISTANT

## 2022-07-18 RX ORDER — DULOXETIN HYDROCHLORIDE 30 MG/1
30 CAPSULE, DELAYED RELEASE ORAL DAILY
Qty: 30 CAPSULE | Refills: 1 | Status: SHIPPED | OUTPATIENT
Start: 2022-07-18 | End: 2022-08-15

## 2022-07-18 RX ORDER — NALTREXONE HYDROCHLORIDE 50 MG/1
50 TABLET, FILM COATED ORAL DAILY
Qty: 30 TABLET | Refills: 0 | Status: SHIPPED | OUTPATIENT
Start: 2022-07-18 | End: 2022-11-14

## 2022-07-18 RX ORDER — EPINEPHRINE 0.3 MG/.3ML
0.3 INJECTION SUBCUTANEOUS
COMMUNITY
Start: 2021-02-24

## 2022-07-18 ASSESSMENT — PAIN SCALES - GENERAL: PAINLEVEL: NO PAIN (0)

## 2022-07-18 ASSESSMENT — ANXIETY QUESTIONNAIRES
GAD7 TOTAL SCORE: 13
7. FEELING AFRAID AS IF SOMETHING AWFUL MIGHT HAPPEN: MORE THAN HALF THE DAYS
7. FEELING AFRAID AS IF SOMETHING AWFUL MIGHT HAPPEN: MORE THAN HALF THE DAYS
6. BECOMING EASILY ANNOYED OR IRRITABLE: MORE THAN HALF THE DAYS
1. FEELING NERVOUS, ANXIOUS, OR ON EDGE: MORE THAN HALF THE DAYS
3. WORRYING TOO MUCH ABOUT DIFFERENT THINGS: MORE THAN HALF THE DAYS
GAD7 TOTAL SCORE: 13
2. NOT BEING ABLE TO STOP OR CONTROL WORRYING: NEARLY EVERY DAY
8. IF YOU CHECKED OFF ANY PROBLEMS, HOW DIFFICULT HAVE THESE MADE IT FOR YOU TO DO YOUR WORK, TAKE CARE OF THINGS AT HOME, OR GET ALONG WITH OTHER PEOPLE?: SOMEWHAT DIFFICULT
5. BEING SO RESTLESS THAT IT IS HARD TO SIT STILL: SEVERAL DAYS
GAD7 TOTAL SCORE: 13
4. TROUBLE RELAXING: SEVERAL DAYS

## 2022-07-18 ASSESSMENT — PATIENT HEALTH QUESTIONNAIRE - PHQ9
SUM OF ALL RESPONSES TO PHQ QUESTIONS 1-9: 12
10. IF YOU CHECKED OFF ANY PROBLEMS, HOW DIFFICULT HAVE THESE PROBLEMS MADE IT FOR YOU TO DO YOUR WORK, TAKE CARE OF THINGS AT HOME, OR GET ALONG WITH OTHER PEOPLE: SOMEWHAT DIFFICULT
SUM OF ALL RESPONSES TO PHQ QUESTIONS 1-9: 12

## 2022-07-18 NOTE — PROGRESS NOTES
Assessment & Plan   Moderate episode of recurrent major depressive disorder (H)  BRADEN (generalized anxiety disorder)  Not improved since starting Lexapro and increasing his dose. Anxiety is still elevated. Long discussion about treatment options. Pt was interested in switching medications. Will switch to Cymbalta to see if this helps more with his anxiety. Continue counseling. Follow-up in 1 month for recheck.   - DULoxetine (CYMBALTA) 30 MG capsule; Take 1 capsule (30 mg) by mouth daily    Alcohol consumption binge drinking  Binge drinks nearly every weekend per his report. Does think his drinking is excessive. I think this is often driven by his mental health, which we are actively treating. Pt was interested in a trial of Naltrexone prn. Follow-up in 1 month.   - DULoxetine (CYMBALTA) 30 MG capsule; Take 1 capsule (30 mg) by mouth daily  - naltrexone (DEPADE/REVIA) 50 MG tablet; Take 1 tablet (50 mg) by mouth daily    Return in about 4 weeks (around 8/15/2022) for In-Clinic Visit, Depression recheck, Anxiety check.    Mukesh Carlson PA-C  [facility]    Sammy Stephenson is a 36 year old, presenting for the following health issues:  Depression and Anxiety      History of Present Illness       Mental Health Follow-up:  Patient presents to follow-up on Depression & Anxiety.Patient's depression since last visit has been:  No change  The patient is having other symptoms associated with depression.  Patient's anxiety since last visit has been:  Worse  The patient is having other symptoms associated with anxiety.  Any significant life events: No  Patient is feeling anxious or having panic attacks.  Patient has concerns about alcohol or drug use.    He eats 0-1 servings of fruits and vegetables daily.He consumes 0 sweetened beverage(s) daily.He exercises with enough effort to increase his heart rate 10 to 19 minutes per day.  He exercises with enough effort to increase his heart rate 3 or  less days per week.   He is taking medications regularly.    Today's PHQ-9         PHQ-9 Total Score: 12    PHQ-9 Q9 Thoughts of better off dead/self-harm past 2 weeks :   Not at all    How difficult have these problems made it for you to do your work, take care of things at home, or get along with other people: Somewhat difficult  Today's BRADEN-7 Score: 13       Review of Systems   See HPI       Objective    /74 (BP Location: Right arm, Patient Position: Sitting, Cuff Size: Adult Large)   Pulse 76   Temp 97.6  F (36.4  C) (Tympanic)   Resp 18   Wt 114.4 kg (252 lb 3.2 oz)   BMI 32.16 kg/m    Body mass index is 32.16 kg/m .  Physical Exam   Constitutional: healthy, alert, and no distress  Head: Normocephalic. Atraumatic  Eyes: No conjunctival injection, sclera anicteric  Respiratory: No resp distress.  Musculoskeletal: extremities normal- no gross deformities noted, and normal muscle tone  Neurologic: Gait normal. CN 2-12 grossly intact  Psychiatric: mentation appears normal and affect normal/bright     .  ..

## 2022-07-18 NOTE — PATIENT INSTRUCTIONS
Stop Lexapro. Start Cymbalta tomorrow.     Try Naltrexone for drinking on the weekends to see if this is helpful.     Continue CBT.     Follow-up in 1 month for recheck.

## 2022-07-19 PROBLEM — F41.1 GAD (GENERALIZED ANXIETY DISORDER): Status: ACTIVE | Noted: 2022-07-19

## 2022-07-19 PROBLEM — F10.10 ALCOHOL CONSUMPTION BINGE DRINKING: Status: ACTIVE | Noted: 2022-07-19

## 2022-08-15 ENCOUNTER — OFFICE VISIT (OUTPATIENT)
Dept: FAMILY MEDICINE | Facility: CLINIC | Age: 37
End: 2022-08-15
Payer: COMMERCIAL

## 2022-08-15 VITALS
RESPIRATION RATE: 18 BRPM | WEIGHT: 251.4 LBS | HEART RATE: 84 BPM | TEMPERATURE: 97.6 F | BODY MASS INDEX: 32.06 KG/M2 | DIASTOLIC BLOOD PRESSURE: 84 MMHG | SYSTOLIC BLOOD PRESSURE: 130 MMHG

## 2022-08-15 DIAGNOSIS — F33.1 MODERATE EPISODE OF RECURRENT MAJOR DEPRESSIVE DISORDER (H): Primary | ICD-10-CM

## 2022-08-15 DIAGNOSIS — F41.1 GAD (GENERALIZED ANXIETY DISORDER): ICD-10-CM

## 2022-08-15 DIAGNOSIS — F10.10 ALCOHOL CONSUMPTION BINGE DRINKING: ICD-10-CM

## 2022-08-15 PROCEDURE — 99214 OFFICE O/P EST MOD 30 MIN: CPT | Performed by: PHYSICIAN ASSISTANT

## 2022-08-15 RX ORDER — DULOXETIN HYDROCHLORIDE 30 MG/1
30 CAPSULE, DELAYED RELEASE ORAL DAILY
Qty: 90 CAPSULE | Refills: 3 | Status: SHIPPED | OUTPATIENT
Start: 2022-08-15 | End: 2022-11-14

## 2022-08-15 ASSESSMENT — ANXIETY QUESTIONNAIRES
1. FEELING NERVOUS, ANXIOUS, OR ON EDGE: MORE THAN HALF THE DAYS
2. NOT BEING ABLE TO STOP OR CONTROL WORRYING: MORE THAN HALF THE DAYS
7. FEELING AFRAID AS IF SOMETHING AWFUL MIGHT HAPPEN: MORE THAN HALF THE DAYS
3. WORRYING TOO MUCH ABOUT DIFFERENT THINGS: MORE THAN HALF THE DAYS
6. BECOMING EASILY ANNOYED OR IRRITABLE: MORE THAN HALF THE DAYS
5. BEING SO RESTLESS THAT IT IS HARD TO SIT STILL: SEVERAL DAYS
GAD7 TOTAL SCORE: 12
GAD7 TOTAL SCORE: 12

## 2022-08-15 ASSESSMENT — PAIN SCALES - GENERAL: PAINLEVEL: NO PAIN (0)

## 2022-08-15 ASSESSMENT — PATIENT HEALTH QUESTIONNAIRE - PHQ9
5. POOR APPETITE OR OVEREATING: SEVERAL DAYS
SUM OF ALL RESPONSES TO PHQ QUESTIONS 1-9: 6

## 2022-08-15 NOTE — PROGRESS NOTES
Assessment & Plan   Moderate episode of recurrent major depressive disorder (H)  BRADEN (generalized anxiety disorder)  Symptoms improving nicely since switch to Cymbalta. Continue current dose for now. Continue CBT started with therapist. Encouraged healthy lifestyle changes. Follow-up in 3 months for recheck.   - DULoxetine (CYMBALTA) 30 MG capsule; Take 1 capsule (30 mg) by mouth daily    Alcohol consumption binge drinking  Did try Naltrexone and is somewhat worked. He did have side effects but he was hungover when he tried this. I recommended a re-trial to see how this affects him. Pt agreed to re-try in the future.     Return in about 3 months (around 11/15/2022) for In-Clinic Visit, Anxiety check, Depression recheck.    OSBALDO L[facility]    Sammy Stephenson is a 36 year old, presenting for the following health issues:  Depression and Anxiety      HPI   Depression and Anxiety Follow-Up    How are you doing with your depression since your last visit? Improved     How are you doing with your anxiety since your last visit?  Improved    Are you having other symptoms that might be associated with depression or anxiety? No    Have you had a significant life event? No     Do you have any concerns with your use of alcohol or other drugs? No    Social History     Tobacco Use     Smoking status: Former Smoker     Types: Cigarettes     Smokeless tobacco: Former User     Types: Chew     Tobacco comment: smoked for 7 years; smoked 1/2 ppd   Vaping Use     Vaping Use: Never used   Substance Use Topics     Alcohol use: Yes     Comment: weekends     Drug use: No     PHQ 5/27/2022 7/18/2022 8/15/2022   PHQ-9 Total Score 10 12 6   Q9: Thoughts of better off dead/self-harm past 2 weeks Not at all Not at all Not at all     BRADEN-7 SCORE 5/27/2022 7/18/2022 8/15/2022   Total Score 12 (moderate anxiety) 13 (moderate anxiety) -   Total Score 12 13 12     Last PHQ-9 8/15/2022   1.  Little  interest or pleasure in doing things 1   2.  Feeling down, depressed, or hopeless 1   3.  Trouble falling or staying asleep, or sleeping too much 0   4.  Feeling tired or having little energy 1   5.  Poor appetite or overeating 2   6.  Feeling bad about yourself 1   7.  Trouble concentrating 0   8.  Moving slowly or restless 0   Q9: Thoughts of better off dead/self-harm past 2 weeks 0   PHQ-9 Total Score 6   Difficulty at work, home, or with people -     BRADEN-7  8/15/2022   1. Feeling nervous, anxious, or on edge 2   2. Not being able to stop or control worrying 2   3. Worrying too much about different things 2   4. Trouble relaxing 1   5. Being so restless that it is hard to sit still 1   6. Becoming easily annoyed or irritable 2   7. Feeling afraid, as if something awful might happen 2   BRADEN-7 Total Score 12   If you checked any problems, how difficult have they made it for you to do your work, take care of things at home, or get along with other people? -       How many servings of fruits and vegetables do you eat daily?  2-3    On average, how many sweetened beverages do you drink each day (Examples: soda, juice, sweet tea, etc.  Do NOT count diet or artificially sweetened beverages)?   1    How many days per week do you exercise enough to make your heart beat faster? 3 or less    How many minutes a day do you exercise enough to make your heart beat faster? 20 - 29    How many days per week do you miss taking your medication? 0    Review of Systems   See HPI       Objective    /84 (BP Location: Right arm, Patient Position: Sitting, Cuff Size: Adult Regular)   Pulse 84   Temp 97.6  F (36.4  C) (Tympanic)   Resp 18   Wt 114 kg (251 lb 6.4 oz)   BMI 32.06 kg/m    Body mass index is 32.06 kg/m .  Physical Exam   Constitutional: healthy, alert, and no distress  Head: Normocephalic. Atraumatic  Eyes: No conjunctival injection, sclera anicteric  Respiratory: No resp distress.  Musculoskeletal: extremities  normal- no gross deformities noted, and normal muscle tone  Neurologic: Gait normal. CN 2-12 grossly intact  Psychiatric: mentation appears normal and affect normal/bright       .  ..

## 2022-10-09 ENCOUNTER — HEALTH MAINTENANCE LETTER (OUTPATIENT)
Age: 37
End: 2022-10-09

## 2022-11-08 ENCOUNTER — HOSPITAL ENCOUNTER (EMERGENCY)
Facility: HOSPITAL | Age: 37
Discharge: HOME | End: 2022-11-08
Admitting: PHYSICIAN ASSISTANT
Payer: COMMERCIAL

## 2022-11-08 ENCOUNTER — APPOINTMENT (OUTPATIENT)
Dept: CT IMAGING | Facility: HOSPITAL | Age: 37
End: 2022-11-08
Attending: EMERGENCY MEDICINE
Payer: COMMERCIAL

## 2022-11-08 VITALS
WEIGHT: 240 LBS | OXYGEN SATURATION: 96 % | RESPIRATION RATE: 18 BRPM | TEMPERATURE: 98.3 F | BODY MASS INDEX: 30.8 KG/M2 | DIASTOLIC BLOOD PRESSURE: 83 MMHG | HEART RATE: 86 BPM | SYSTOLIC BLOOD PRESSURE: 135 MMHG | HEIGHT: 74 IN

## 2022-11-08 DIAGNOSIS — M54.50 ACUTE RIGHT-SIDED LOW BACK PAIN WITHOUT SCIATICA: ICD-10-CM

## 2022-11-08 LAB
ALBUMIN UR-MCNC: NEGATIVE MG/DL
ANION GAP SERPL CALCULATED.3IONS-SCNC: 11 MMOL/L (ref 7–15)
APPEARANCE UR: CLEAR
BASOPHILS # BLD AUTO: 0.1 10E3/UL (ref 0–0.2)
BASOPHILS NFR BLD AUTO: 1 %
BILIRUB UR QL STRIP: NEGATIVE
BUN SERPL-MCNC: 6.3 MG/DL (ref 6–20)
CALCIUM SERPL-MCNC: 9.4 MG/DL (ref 8.6–10)
CHLORIDE SERPL-SCNC: 104 MMOL/L (ref 98–107)
COLOR UR AUTO: ABNORMAL
CREAT SERPL-MCNC: 0.75 MG/DL (ref 0.67–1.17)
DEPRECATED HCO3 PLAS-SCNC: 27 MMOL/L (ref 22–29)
EOSINOPHIL # BLD AUTO: 0.1 10E3/UL (ref 0–0.7)
EOSINOPHIL NFR BLD AUTO: 2 %
ERYTHROCYTE [DISTWIDTH] IN BLOOD BY AUTOMATED COUNT: 11.9 % (ref 10–15)
GFR SERPL CREATININE-BSD FRML MDRD: >90 ML/MIN/1.73M2
GLUCOSE SERPL-MCNC: 89 MG/DL (ref 70–99)
GLUCOSE UR STRIP-MCNC: NEGATIVE MG/DL
HCT VFR BLD AUTO: 41.2 % (ref 40–53)
HGB BLD-MCNC: 13.9 G/DL (ref 13.3–17.7)
HGB UR QL STRIP: NEGATIVE
IMM GRANULOCYTES # BLD: 0 10E3/UL
IMM GRANULOCYTES NFR BLD: 0 %
KETONES UR STRIP-MCNC: NEGATIVE MG/DL
LEUKOCYTE ESTERASE UR QL STRIP: NEGATIVE
LYMPHOCYTES # BLD AUTO: 1.6 10E3/UL (ref 0.8–5.3)
LYMPHOCYTES NFR BLD AUTO: 31 %
MCH RBC QN AUTO: 31.1 PG (ref 26.5–33)
MCHC RBC AUTO-ENTMCNC: 33.7 G/DL (ref 31.5–36.5)
MCV RBC AUTO: 92 FL (ref 78–100)
MONOCYTES # BLD AUTO: 0.6 10E3/UL (ref 0–1.3)
MONOCYTES NFR BLD AUTO: 12 %
NEUTROPHILS # BLD AUTO: 2.7 10E3/UL (ref 1.6–8.3)
NEUTROPHILS NFR BLD AUTO: 54 %
NITRATE UR QL: NEGATIVE
NRBC # BLD AUTO: 0 10E3/UL
NRBC BLD AUTO-RTO: 0 /100
PH UR STRIP: 7.5 [PH] (ref 5–7)
PLATELET # BLD AUTO: 307 10E3/UL (ref 150–450)
POTASSIUM SERPL-SCNC: 4.2 MMOL/L (ref 3.4–5.3)
RBC # BLD AUTO: 4.47 10E6/UL (ref 4.4–5.9)
RBC URINE: 0 /HPF
SODIUM SERPL-SCNC: 142 MMOL/L (ref 136–145)
SP GR UR STRIP: 1.01 (ref 1–1.03)
UROBILINOGEN UR STRIP-MCNC: <2 MG/DL
WBC # BLD AUTO: 5 10E3/UL (ref 4–11)
WBC URINE: <1 /HPF

## 2022-11-08 PROCEDURE — 85014 HEMATOCRIT: CPT | Performed by: EMERGENCY MEDICINE

## 2022-11-08 PROCEDURE — 250N000013 HC RX MED GY IP 250 OP 250 PS 637: Performed by: EMERGENCY MEDICINE

## 2022-11-08 PROCEDURE — 99285 EMERGENCY DEPT VISIT HI MDM: CPT | Mod: 25

## 2022-11-08 PROCEDURE — 96374 THER/PROPH/DIAG INJ IV PUSH: CPT

## 2022-11-08 PROCEDURE — 258N000003 HC RX IP 258 OP 636: Performed by: EMERGENCY MEDICINE

## 2022-11-08 PROCEDURE — 96361 HYDRATE IV INFUSION ADD-ON: CPT

## 2022-11-08 PROCEDURE — 74176 CT ABD & PELVIS W/O CONTRAST: CPT

## 2022-11-08 PROCEDURE — 250N000011 HC RX IP 250 OP 636: Performed by: EMERGENCY MEDICINE

## 2022-11-08 PROCEDURE — 80048 BASIC METABOLIC PNL TOTAL CA: CPT | Performed by: EMERGENCY MEDICINE

## 2022-11-08 PROCEDURE — 81001 URINALYSIS AUTO W/SCOPE: CPT | Performed by: EMERGENCY MEDICINE

## 2022-11-08 PROCEDURE — 36415 COLL VENOUS BLD VENIPUNCTURE: CPT | Performed by: EMERGENCY MEDICINE

## 2022-11-08 RX ORDER — CYCLOBENZAPRINE HCL 10 MG
10 TABLET ORAL 3 TIMES DAILY PRN
Qty: 20 TABLET | Refills: 0 | Status: SHIPPED | OUTPATIENT
Start: 2022-11-08 | End: 2022-11-14

## 2022-11-08 RX ORDER — KETOROLAC TROMETHAMINE 15 MG/ML
15 INJECTION, SOLUTION INTRAMUSCULAR; INTRAVENOUS ONCE
Status: COMPLETED | OUTPATIENT
Start: 2022-11-08 | End: 2022-11-08

## 2022-11-08 RX ORDER — OXYCODONE AND ACETAMINOPHEN 5; 325 MG/1; MG/1
1 TABLET ORAL ONCE
Status: DISCONTINUED | OUTPATIENT
Start: 2022-11-08 | End: 2022-11-08 | Stop reason: HOSPADM

## 2022-11-08 RX ADMIN — KETOROLAC TROMETHAMINE 15 MG: 15 INJECTION, SOLUTION INTRAMUSCULAR; INTRAVENOUS at 12:24

## 2022-11-08 RX ADMIN — SODIUM CHLORIDE 1000 ML: 9 INJECTION, SOLUTION INTRAVENOUS at 12:22

## 2022-11-08 ASSESSMENT — ENCOUNTER SYMPTOMS: BACK PAIN: 1

## 2022-11-08 NOTE — ED NOTES
"ED Triage Provider Note  [facility] EMERGENCY DEPARTMENT  Encounter Date: Nov 8, 2022    History:  Chief Complaint   Patient presents with     Back Pain     Mukesh Wolff is a 37 year old male who presents to the ED with back pain since Sunday night.  Getting in car, onset back pain.  Lower. Worse today.  Right worse than left.  Pain escalated.  Began to become concerned it was a \"kidney stone\" as he has had those in the past and his urine is dark.  Some numbness to right hip, no weakness.    Review of Systems:  Pain, numnbess    Exam:  BP (!) 157/95   Pulse 96   Temp 98.3  F (36.8  C) (Oral)   Resp 20   SpO2 98%   General: No acute distress. Appears stated age.   Cardio: normal rate, extremities well perfused  Resp: Normal work of breathing, grossly normal respiratory rate  Neuro: Alert. Facial movement grossly symmetric. Grossly intact strength.       Medical Decision Making:  Patient arriving to the ED with problem as above. A medical screening exam was performed. Initial differential diagnosis includes but not limited to back strain, obstucting ureteral stone, radiculopathy.    Orders Placed This Encounter   Procedures     CT Abdomen Pelvis w/o Contrast     Basic metabolic panel     UA with Microscopic reflex to Culture     Peripheral IV catheter     CBC with platelets differential    orders initiated from Triage. The patient is most appropriate to return to the waiting room.       Raul Sethi MD  11/8/2022 at 11:58 AM     Raul Sethi MD  11/08/22 1202    " Mom noticed pt sucking on a woodchip earlier today, states \"by the time I saw it, the woodchip was already soft so I don't know if he swallowed any.\" Reports he's occasionally been dry heaving/vomiting since.    Statement Selected

## 2022-11-08 NOTE — Clinical Note
Mukesh Wolff was seen and treated in our emergency department on 11/8/2022.  He may return to work on 11/12/2022.       If you have any questions or concerns, please don't hesitate to call.      Mable Begum PA-C

## 2022-11-08 NOTE — ED TRIAGE NOTES
Patient arrive with complaints of back pain started yesterday. Thought he moved wrong. Ice/heat last night did not help. Hx kidney stone, feels similar. Dark urine. Took tylenol this am without relief. Worse on lower right.     Triage Assessment     Row Name 11/08/22 1201       Triage Assessment (Adult)    Airway WDL WDL

## 2022-11-08 NOTE — ED PROVIDER NOTES
EMERGENCY DEPARTMENT ENCOUNTER      NAME: Mukesh Wolff  AGE: 37 year old male  YOB: 1985  MRN: [id number]  EVALUATION DATE & TIME: No admission date for patient encounter.    PCP: No Ref-Primary, Physician    ED PROVIDER: Mable Begum PA-C      Chief Complaint   Patient presents with     Back Pain         FINAL IMPRESSION:  1. Acute right-sided low back pain without sciatica          MEDICAL DECISION MAKING:    Pertinent Labs & Imaging studies reviewed. (See chart for details)  37 year old male presents to the Emergency Department for evaluation of atraumatic right low back pain onset 2 days ago. History of kidney stones. Denies dysuria, hematuria, bowel/bladder dysfunction, leg weakness or numbness, fevers or chills.     Exam reveals well-appearing male in no obvious distress.  Vitals reviewed and notable for elevated blood pressure, improved on recheck. No midline or paraspinal tenderness. No step off deformities or overlying skin changes. Tenderness to palpation of right lateral low back musculature. No abdominal tenderness, rebound or guarding. No flank or CVA tenderness. 5/5 strength of bilateral lower extremities. Symmetric peripheral pulses. Sensation intact. Negative SLR bilaterally. DTRs 2+ and symmetric. No foot drop. Differential diagnosis includes but not limited to muscle spasm/strain, herniated disc w/ radicular pain including sciatica, cauda equina syndrome, vertebral fracture, vertebral tumor, epidural abscess / discitis, ureterolithiasis, pyelonephritis.    Work up initiated by provider in triage. Patient received toradol for pain control with some relief. UA without evidence of infection and no RBCs. CT abd pelvis without cause for his pain. No urinary tract calculus nor hydronephrosis. Labs reassuring. No leukocytosis. Renal function and electrolytes WNL. Suspect likely muscle strain versus lumbar radiculopathy. He denies any saddle anesthesia, bowel or bladder incontinence,  history of immunocompromise, fever or night sweats, spinal instrumentation, or other red flags. Clinically, nothing to suggest spinal cord compromise, epidural bleed or abscess, osteomyelitis, discitis. We'll recommend conservative management with tylenol, ibuprofen, and Flexeril. Referral for spine center placed in event pain does not improve with above measures. All questions were answered and reasons to return were discussed. Patient felt comfortable with this plan and the patient was discharged in stable condition.    Medical Decision Making    Supplemental history from: N/A    External Record(s) Reviewed: N/A    Differential Diagnosis: See MDM charting for differential considered.     I performed an independent interpretation of the: N/A    Discussed with radiology regarding test interpretation: N/A    Discussion of management with another provider: See chart documentation, if applicable and See ED Course    The following testing was considered but ultimately not selected: MRI Imaging: patient with no red flag symptoms to warrant emergent MR lumbar spine at this time. Will try supportive treatment first.     I considered prescription management with: Symptomatic Management    The patient's care impacted: None    Consideration of Admission/Observation: Admission/Observation considered but ultimately discharged: negative work up, no obstructing/infected ureteral stone, pain is managed with no need for admission at this time    Care significantly affected by Social Determinants of Health including: N/A    0 minutes of critical care time     ED COURSE:  2:25 PM I met with the patient, obtained history, performed an initial exam, and discussed options and plan for diagnostics and treatment here in the ED.  3:05 PM Patient discharged after being provided with extensive anticipatory guidance and given return precautions, importance of PCP follow-up emphasized.    At the conclusion of the encounter I discussed the  results of all of the tests and the disposition. The questions were answered. The patient acknowledged understanding and was agreeable with the care plan.     MEDICATIONS GIVEN IN THE EMERGENCY:  Medications   0.9% sodium chloride BOLUS (0 mLs Intravenous Stopped 11/8/22 1339)   ketorolac (TORADOL) injection 15 mg (15 mg Intravenous Given 11/8/22 1224)       NEW PRESCRIPTIONS STARTED AT TODAY'S ER VISIT  Discharge Medication List as of 11/8/2022  3:07 PM      START taking these medications    Details   cyclobenzaprine (FLEXERIL) 10 MG tablet Take 1 tablet (10 mg) by mouth 3 times daily as needed for muscle spasms, Disp-20 tablet, R-0, Local Print                  =================================================================    HPI:    Patient information was obtained from: Patient    Use of Interpretor: N/A       Mukesh Wolff is a 37 year old male with a pertinent history of kidney stones, depressive disorder, BRADEN, phenylketonuria who presents to this ED via walk-in for evaluation of back pain.     Patient reports right sided lower back pain for the past 2 days. Denies any falls or trauma. States he has been using ice packs, heating pads and tylenol with minimal relief. States his pain was worse this morning, now radiating into his right leg and groin when he ambulates. Reports darker colored urine and has a history of kidney stones. Denies any dysuria, gross hematuria, fevers, chills, leg weakness, numbness, bowel/bladder dysfunction, saddle anesthesia. No history of back surgeries, immunocompromise, IVDU. Denies any other medical complaint at this time.      REVIEW OF SYSTEMS:  Review of Systems   Constitutional: Negative for chills and fever.   Gastrointestinal: Negative for abdominal pain, nausea and vomiting.   Genitourinary: Negative for dysuria, frequency and hematuria.   Musculoskeletal: Positive for back pain.   Skin: Negative for rash.   Allergic/Immunologic: Negative for immunocompromised state.    Neurological: Negative for weakness and numbness.   All other systems reviewed and are negative.       PAST MEDICAL HISTORY:  Past Medical History:   Diagnosis Date     Depressive disorder      BRADEN (generalized anxiety disorder)      JRA (juvenile rheumatoid arthritis) (H)      Phenylketonuria (PKU) (H)      PKU (phenylketonuria) (H)        PAST SURGICAL HISTORY:  No past surgical history on file.        CURRENT MEDICATIONS:    No current facility-administered medications for this encounter.    Current Outpatient Medications:      cyclobenzaprine (FLEXERIL) 10 MG tablet, Take 1 tablet (10 mg) by mouth 3 times daily as needed for muscle spasms, Disp: 20 tablet, Rfl: 0     Acetaminophen (TYLENOL PO), PRN, Disp: , Rfl:      DULoxetine (CYMBALTA) 30 MG capsule, Take 1 capsule (30 mg) by mouth daily, Disp: 90 capsule, Rfl: 3     EPINEPHrine (ANY BX GENERIC EQUIV) 0.3 MG/0.3ML injection 2-pack, Inject 0.3 mg into the muscle, Disp: , Rfl:      IBUPROFEN PO, PRN, Disp: , Rfl:      loratadine (CLARITIN) 10 MG tablet, Take 10 mg by mouth daily, Disp: , Rfl:      naltrexone (DEPADE/REVIA) 50 MG tablet, Take 1 tablet (50 mg) by mouth daily, Disp: 30 tablet, Rfl: 0     Nutritional Supplements (GLYTACTIN RTD 15) LIQD, Take 4 Packages by mouth daily, Disp: 26280 mL, Rfl: 12     PALYNZIQ 20 MG/ML injection, Inject as directed, Disp: 224 mL, Rfl: 1      ALLERGIES:  No Known Allergies    FAMILY HISTORY:  Family History   Problem Relation Age of Onset     Rheumatoid Arthritis Mother      Rheumatoid Arthritis Maternal Grandmother      Cerebrovascular Disease Paternal Grandfather      Diabetes Paternal Uncle        SOCIAL HISTORY:   Social History     Socioeconomic History     Marital status: Single   Tobacco Use     Smoking status: Former     Types: Cigarettes     Smokeless tobacco: Former     Types: Chew     Tobacco comments:     smoked for 7 years; smoked 1/2 ppd   Vaping Use     Vaping Use: Never used   Substance and Sexual  "Activity     Alcohol use: Yes     Comment: weekends     Drug use: No     Sexual activity: Not Currently       VITALS:  Patient Vitals for the past 24 hrs:   BP Temp Temp src Pulse Resp SpO2 Height Weight   11/08/22 1518 135/83 -- -- 86 18 96 % -- --   11/08/22 1202 -- -- -- -- -- -- 1.88 m (6' 2\") 108.9 kg (240 lb)   11/08/22 1159 (!) 157/95 98.3  F (36.8  C) Oral 96 20 98 % -- --       PHYSICAL EXAM  Constitutional: Well developed, obese, NAD  HENT: Normocephalic, Atraumatic, mucous membranes moist  Neck-  Normal range of motion, No tenderness, Supple, No stridor.   Eyes: PERRL, Conjunctiva normal, No discharge.   Respiratory: Normal breath sounds, No respiratory distress, No wheezing, Speaks full sentences easily. No cough.  Cardiovascular: Normal heart rate, Regular rhythm, No murmurs, No rubs, No gallops. Chest wall nontender.  GI: Soft, No tenderness, No masses, No flank tenderness. No rebound or guarding.  Musculoskeletal: 2+ DP pulses bilaterally. No edema. No cyanosis, No clubbing. Good range of motion in all major joints. Lumbar flexion and extension WNL. Straight leg raise negative bilaterally. No midline or paraspinal tenderness. Tenderness to right lateral low back musculature. No overlying skin changes. No foot drop. Able to heel and toe walk without difficulty. 5/5 strength for the bilateral hip flexors, knee flexors/extensors, ankle dorsiflexors/plantar flexors, great toe extensors, ankle evertors/invertors.  Integument: Warm, Dry, No erythema, No rash. No petechiae.  Neurologic: Patient is alert and oriented ×3. Face is symmetric. Speech is normal. Strength is full and equal in both upper and lower extremities. Sensory is intact. Patient has a normal steady gait. Coordination is intact. Patellar and achilles reflexes are 2+ and symmetric.  Psychiatric: Affect normal, Judgment normal, Mood normal. Cooperative.      LAB:  All pertinent labs reviewed and interpreted.  Recent Results (from the past 24 " hour(s))   Basic metabolic panel    Collection Time: 11/08/22 12:21 PM   Result Value Ref Range    Sodium 142 136 - 145 mmol/L    Potassium 4.2 3.4 - 5.3 mmol/L    Chloride 104 98 - 107 mmol/L    Carbon Dioxide (CO2) 27 22 - 29 mmol/L    Anion Gap 11 7 - 15 mmol/L    Urea Nitrogen 6.3 6.0 - 20.0 mg/dL    Creatinine 0.75 0.67 - 1.17 mg/dL    Calcium 9.4 8.6 - 10.0 mg/dL    Glucose 89 70 - 99 mg/dL    GFR Estimate >90 >60 mL/min/1.73m2   CBC with platelets and differential    Collection Time: 11/08/22 12:21 PM   Result Value Ref Range    WBC Count 5.0 4.0 - 11.0 10e3/uL    RBC Count 4.47 4.40 - 5.90 10e6/uL    Hemoglobin 13.9 13.3 - 17.7 g/dL    Hematocrit 41.2 40.0 - 53.0 %    MCV 92 78 - 100 fL    MCH 31.1 26.5 - 33.0 pg    MCHC 33.7 31.5 - 36.5 g/dL    RDW 11.9 10.0 - 15.0 %    Platelet Count 307 150 - 450 10e3/uL    % Neutrophils 54 %    % Lymphocytes 31 %    % Monocytes 12 %    % Eosinophils 2 %    % Basophils 1 %    % Immature Granulocytes 0 %    NRBCs per 100 WBC 0 <1 /100    Absolute Neutrophils 2.7 1.6 - 8.3 10e3/uL    Absolute Lymphocytes 1.6 0.8 - 5.3 10e3/uL    Absolute Monocytes 0.6 0.0 - 1.3 10e3/uL    Absolute Eosinophils 0.1 0.0 - 0.7 10e3/uL    Absolute Basophils 0.1 0.0 - 0.2 10e3/uL    Absolute Immature Granulocytes 0.0 <=0.4 10e3/uL    Absolute NRBCs 0.0 10e3/uL   UA with Microscopic reflex to Culture    Collection Time: 11/08/22  1:42 PM    Specimen: Urine, Clean Catch   Result Value Ref Range    Color Urine Light Yellow Colorless, Straw, Light Yellow, Yellow    Appearance Urine Clear Clear    Glucose Urine Negative Negative mg/dL    Bilirubin Urine Negative Negative    Ketones Urine Negative Negative mg/dL    Specific Gravity Urine 1.008 1.001 - 1.030    Blood Urine Negative Negative    pH Urine 7.5 (H) 5.0 - 7.0    Protein Albumin Urine Negative Negative mg/dL    Urobilinogen Urine <2.0 <2.0 mg/dL    Nitrite Urine Negative Negative    Leukocyte Esterase Urine Negative Negative    RBC Urine 0 <=2  /HPF    WBC Urine <1 <=5 /HPF         RADIOLOGY:  Reviewed all pertinent imaging. Please see official radiology report.  CT Abdomen Pelvis w/o Contrast   Final Result   IMPRESSION:    1.  No urinary tract calculus nor hydronephrosis. No abnormalities to account for symptoms of flank pain.   2.  Diffuse hepatic steatosis.               I, Wang Peña, am serving as a scribe to document services personally performed by Mable Begum PA-C based on my observation and the provider's statements to me. I, Mable Begum PA-C attest that Wang Peña is acting in a scribe capacity, has observed my performance of the services and has documented them in accordance with my direction.    Mable Begum PA-C  Emergency Medicine  [facility]  11/8/2022       Mable Begum PA-C  11/17/22 1129     6

## 2022-11-14 ENCOUNTER — OFFICE VISIT (OUTPATIENT)
Dept: FAMILY MEDICINE | Facility: CLINIC | Age: 37
End: 2022-11-14
Payer: COMMERCIAL

## 2022-11-14 VITALS
SYSTOLIC BLOOD PRESSURE: 112 MMHG | TEMPERATURE: 98.6 F | DIASTOLIC BLOOD PRESSURE: 78 MMHG | WEIGHT: 250.6 LBS | RESPIRATION RATE: 18 BRPM | BODY MASS INDEX: 32.18 KG/M2 | HEART RATE: 108 BPM

## 2022-11-14 DIAGNOSIS — Z23 HIGH PRIORITY FOR 2019-NCOV VACCINE: ICD-10-CM

## 2022-11-14 DIAGNOSIS — R63.2 BINGE EATING: ICD-10-CM

## 2022-11-14 DIAGNOSIS — F10.10 ALCOHOL CONSUMPTION BINGE DRINKING: ICD-10-CM

## 2022-11-14 DIAGNOSIS — F33.1 MODERATE EPISODE OF RECURRENT MAJOR DEPRESSIVE DISORDER (H): Primary | ICD-10-CM

## 2022-11-14 DIAGNOSIS — F41.1 GAD (GENERALIZED ANXIETY DISORDER): ICD-10-CM

## 2022-11-14 PROCEDURE — 0124A COVID-19,PF,PFIZER BOOSTER BIVALENT: CPT | Performed by: PHYSICIAN ASSISTANT

## 2022-11-14 PROCEDURE — 99214 OFFICE O/P EST MOD 30 MIN: CPT | Mod: 25 | Performed by: PHYSICIAN ASSISTANT

## 2022-11-14 PROCEDURE — 91312 COVID-19,PF,PFIZER BOOSTER BIVALENT: CPT | Performed by: PHYSICIAN ASSISTANT

## 2022-11-14 PROCEDURE — 90686 IIV4 VACC NO PRSV 0.5 ML IM: CPT | Performed by: PHYSICIAN ASSISTANT

## 2022-11-14 PROCEDURE — 90471 IMMUNIZATION ADMIN: CPT | Performed by: PHYSICIAN ASSISTANT

## 2022-11-14 RX ORDER — DULOXETIN HYDROCHLORIDE 60 MG/1
60 CAPSULE, DELAYED RELEASE ORAL DAILY
Qty: 90 CAPSULE | Refills: 3 | Status: SHIPPED | OUTPATIENT
Start: 2022-11-14 | End: 2022-12-27

## 2022-11-14 RX ORDER — NALTREXONE HYDROCHLORIDE 50 MG/1
50 TABLET, FILM COATED ORAL DAILY
Qty: 30 TABLET | Refills: 3 | Status: SHIPPED | OUTPATIENT
Start: 2022-11-14

## 2022-11-14 ASSESSMENT — ANXIETY QUESTIONNAIRES
IF YOU CHECKED OFF ANY PROBLEMS ON THIS QUESTIONNAIRE, HOW DIFFICULT HAVE THESE PROBLEMS MADE IT FOR YOU TO DO YOUR WORK, TAKE CARE OF THINGS AT HOME, OR GET ALONG WITH OTHER PEOPLE: SOMEWHAT DIFFICULT
1. FEELING NERVOUS, ANXIOUS, OR ON EDGE: MORE THAN HALF THE DAYS
GAD7 TOTAL SCORE: 8
7. FEELING AFRAID AS IF SOMETHING AWFUL MIGHT HAPPEN: SEVERAL DAYS
GAD7 TOTAL SCORE: 8
6. BECOMING EASILY ANNOYED OR IRRITABLE: SEVERAL DAYS
2. NOT BEING ABLE TO STOP OR CONTROL WORRYING: MORE THAN HALF THE DAYS
7. FEELING AFRAID AS IF SOMETHING AWFUL MIGHT HAPPEN: SEVERAL DAYS
GAD7 TOTAL SCORE: 8
8. IF YOU CHECKED OFF ANY PROBLEMS, HOW DIFFICULT HAVE THESE MADE IT FOR YOU TO DO YOUR WORK, TAKE CARE OF THINGS AT HOME, OR GET ALONG WITH OTHER PEOPLE?: SOMEWHAT DIFFICULT
4. TROUBLE RELAXING: SEVERAL DAYS
3. WORRYING TOO MUCH ABOUT DIFFERENT THINGS: SEVERAL DAYS
5. BEING SO RESTLESS THAT IT IS HARD TO SIT STILL: NOT AT ALL

## 2022-11-14 ASSESSMENT — PATIENT HEALTH QUESTIONNAIRE - PHQ9
SUM OF ALL RESPONSES TO PHQ QUESTIONS 1-9: 6
SUM OF ALL RESPONSES TO PHQ QUESTIONS 1-9: 6
10. IF YOU CHECKED OFF ANY PROBLEMS, HOW DIFFICULT HAVE THESE PROBLEMS MADE IT FOR YOU TO DO YOUR WORK, TAKE CARE OF THINGS AT HOME, OR GET ALONG WITH OTHER PEOPLE: SOMEWHAT DIFFICULT

## 2022-11-14 ASSESSMENT — PAIN SCALES - GENERAL: PAINLEVEL: NO PAIN (0)

## 2022-11-14 ASSESSMENT — ENCOUNTER SYMPTOMS: NERVOUS/ANXIOUS: 1

## 2022-11-14 NOTE — PROGRESS NOTES
"Assessment & Plan   Moderate episode of recurrent major depressive disorder (H)  BRADEN (generalized anxiety disorder)  Symptoms improving. Going to counseling. I do think a dose adjustment would help even more given persistent symptoms. Increase to 60 mg daily. Continue counseling. Follow-up in 6 months if stable, sooner if not.   - DULoxetine (CYMBALTA) 60 MG capsule; Take 1 capsule (60 mg) by mouth daily    Alcohol consumption binge drinking  Binge eating   Naltrexone has been helpful for alcohol binges. Through therapy, he has noticed that he also binges on food every week. We discussed being evaluate for a binge eating disorder with [facility]. Pt will reach out to them. IN addition, he will take Naltrexone again and see if this helps with any food binging. If helpful, we could use this daily for dietary changes. Follow-up based on response.   - DULoxetine (CYMBALTA) 60 MG capsule; Take 1 capsule (60 mg) by mouth daily  - naltrexone (DEPADE/REVIA) 50 MG tablet; Take 1 tablet (50 mg) by mouth daily    High priority for 2019-nCoV vaccine  Updated Covid and flu shots today.   - COVID-19,PF,PFIZER BOOSTER BIVALENT 12+Yrs     BMI:   Estimated body mass index is 32.18 kg/m  as calculated from the following:    Height as of 11/8/22: 1.88 m (6' 2\").    Weight as of this encounter: 113.7 kg (250 lb 9.6 oz).   Weight management plan: Discussed healthy diet and exercise guidelines    Return in about 6 months (around 5/14/2023) for In-Clinic Visit, Depression recheck, Anxiety check.    Mukesh Carlson PA-C  [facility]    Sammy Stephenson is a 37 year old, presenting for the following health issues:  Depression, Anxiety, and Imm/Inj (COVID-19 VACCINE)      Anxiety    History of Present Illness       Mental Health Follow-up:  Patient presents to follow-up on Depression & Anxiety.Patient's depression since last visit has been:  Medium  The patient is not having other symptoms associated with " depression.  Patient's anxiety since last visit has been:  Medium  The patient is not having other symptoms associated with anxiety.  Any significant life events: No  Patient is feeling anxious or having panic attacks.  Patient has no concerns about alcohol or drug use.    He eats 2-3 servings of fruits and vegetables daily.He consumes 1 sweetened beverage(s) daily.He exercises with enough effort to increase his heart rate 10 to 19 minutes per day.  He exercises with enough effort to increase his heart rate 3 or less days per week.   He is taking medications regularly.    Today's PHQ-9         PHQ-9 Total Score: 6    PHQ-9 Q9 Thoughts of better off dead/self-harm past 2 weeks :   Not at all    How difficult have these problems made it for you to do your work, take care of things at home, or get along with other people: Somewhat difficult  Today's BRADEN-7 Score: 8       PHQ 7/18/2022 8/15/2022 11/14/2022   PHQ-9 Total Score 12 6 6   Q9: Thoughts of better off dead/self-harm past 2 weeks Not at all Not at all Not at all     BRADEN-7 SCORE 7/18/2022 8/15/2022 11/14/2022   Total Score 13 (moderate anxiety) - 8 (mild anxiety)   Total Score 13 12 8     Review of Systems   Psychiatric/Behavioral: The patient is nervous/anxious.           Objective    /78 (BP Location: Right arm, Patient Position: Sitting, Cuff Size: Adult Large)   Pulse 108   Temp 98.6  F (37  C) (Tympanic)   Resp 18   Wt 113.7 kg (250 lb 9.6 oz)   BMI 32.18 kg/m    Body mass index is 32.18 kg/m .  Physical Exam   Constitutional: healthy, alert, and no distress  Head: Normocephalic. Atraumatic  Eyes: No conjunctival injection, sclera anicteric  Respiratory: No resp distress.  Musculoskeletal: extremities normal- no gross deformities noted, and normal muscle tone  Neurologic: Gait normal. CN 2-12 grossly intact  Psychiatric: mentation appears normal and affect normal/bright

## 2022-11-14 NOTE — PATIENT INSTRUCTIONS
Take a look at the [facility] about your binge eating questions/evaluation.     [email or web address]    You can trial a half-tablet of Naltrexone to see if this helps with your binge eating.     New Rx for Cymbalta.

## 2022-11-17 ASSESSMENT — ENCOUNTER SYMPTOMS
FEVER: 0
FREQUENCY: 0
DYSURIA: 0
CHILLS: 0
ABDOMINAL PAIN: 0
WEAKNESS: 0
VOMITING: 0
HEMATURIA: 0
NUMBNESS: 0
NAUSEA: 0

## 2022-12-27 ENCOUNTER — MYC MEDICAL ADVICE (OUTPATIENT)
Dept: FAMILY MEDICINE | Facility: CLINIC | Age: 37
End: 2022-12-27

## 2022-12-27 DIAGNOSIS — F33.1 MODERATE EPISODE OF RECURRENT MAJOR DEPRESSIVE DISORDER (H): ICD-10-CM

## 2022-12-27 DIAGNOSIS — F10.10 ALCOHOL CONSUMPTION BINGE DRINKING: ICD-10-CM

## 2022-12-27 DIAGNOSIS — F41.1 GAD (GENERALIZED ANXIETY DISORDER): ICD-10-CM

## 2022-12-27 RX ORDER — DULOXETIN HYDROCHLORIDE 60 MG/1
60 CAPSULE, DELAYED RELEASE ORAL DAILY
Qty: 7 CAPSULE | Refills: 0 | Status: SHIPPED | OUTPATIENT
Start: 2022-12-27 | End: 2023-02-22

## 2023-02-22 ENCOUNTER — OFFICE VISIT (OUTPATIENT)
Dept: FAMILY MEDICINE | Facility: CLINIC | Age: 38
End: 2023-02-22
Payer: COMMERCIAL

## 2023-02-22 VITALS
TEMPERATURE: 97.7 F | HEART RATE: 100 BPM | OXYGEN SATURATION: 96 % | HEIGHT: 74 IN | SYSTOLIC BLOOD PRESSURE: 124 MMHG | WEIGHT: 249 LBS | DIASTOLIC BLOOD PRESSURE: 84 MMHG | BODY MASS INDEX: 31.95 KG/M2 | RESPIRATION RATE: 16 BRPM

## 2023-02-22 DIAGNOSIS — B07.0 PLANTAR WARTS: ICD-10-CM

## 2023-02-22 DIAGNOSIS — F41.1 GAD (GENERALIZED ANXIETY DISORDER): ICD-10-CM

## 2023-02-22 DIAGNOSIS — R53.83 OTHER FATIGUE: ICD-10-CM

## 2023-02-22 DIAGNOSIS — F10.10 ALCOHOL CONSUMPTION BINGE DRINKING: ICD-10-CM

## 2023-02-22 DIAGNOSIS — F33.1 MODERATE EPISODE OF RECURRENT MAJOR DEPRESSIVE DISORDER (H): Primary | ICD-10-CM

## 2023-02-22 PROCEDURE — 99214 OFFICE O/P EST MOD 30 MIN: CPT | Mod: 25 | Performed by: PHYSICIAN ASSISTANT

## 2023-02-22 PROCEDURE — 17110 DESTRUCTION B9 LES UP TO 14: CPT | Performed by: PHYSICIAN ASSISTANT

## 2023-02-22 RX ORDER — DULOXETIN HYDROCHLORIDE 60 MG/1
60 CAPSULE, DELAYED RELEASE ORAL DAILY
Qty: 90 CAPSULE | Refills: 3 | Status: SHIPPED | OUTPATIENT
Start: 2023-02-22 | End: 2023-03-29

## 2023-02-22 RX ORDER — FLUOXETINE 10 MG/1
10 CAPSULE ORAL DAILY
Qty: 30 CAPSULE | Refills: 1 | Status: SHIPPED | OUTPATIENT
Start: 2023-02-22 | End: 2023-03-29

## 2023-02-22 ASSESSMENT — COLUMBIA-SUICIDE SEVERITY RATING SCALE - C-SSRS
5. IN THE PAST MONTH, HAVE YOU STARTED TO WORK OUT OR WORKED OUT THE DETAILS OF HOW TO KILL YOURSELF? DO YOU INTEND TO CARRY OUT THIS PLAN?: NO
5. IN THE PAST MONTH, HAVE YOU STARTED TO WORK OUT OR WORKED OUT THE DETAILS OF HOW TO KILL YOURSELF? DO YOU INTEND TO CARRY OUT THIS PLAN?: NO
2. IN THE PAST MONTH, HAVE YOU ACTUALLY HAD ANY THOUGHTS OF KILLING YOURSELF?: YES
6. HAVE YOU EVER DONE ANYTHING, STARTED TO DO ANYTHING, OR PREPARED TO DO ANYTHING TO END YOUR LIFE?: YES, LIFETIME
3. IN THE PAST MONTH, HAVE YOU BEEN THINKING ABOUT HOW YOU MIGHT KILL YOURSELF?: NO
1. WITHIN THE PAST MONTH, HAVE YOU WISHED YOU WERE DEAD OR WISHED YOU COULD GO TO SLEEP AND NOT WAKE UP?: YES

## 2023-02-22 ASSESSMENT — ANXIETY QUESTIONNAIRES
3. WORRYING TOO MUCH ABOUT DIFFERENT THINGS: NEARLY EVERY DAY
IF YOU CHECKED OFF ANY PROBLEMS ON THIS QUESTIONNAIRE, HOW DIFFICULT HAVE THESE PROBLEMS MADE IT FOR YOU TO DO YOUR WORK, TAKE CARE OF THINGS AT HOME, OR GET ALONG WITH OTHER PEOPLE: VERY DIFFICULT
7. FEELING AFRAID AS IF SOMETHING AWFUL MIGHT HAPPEN: NEARLY EVERY DAY
5. BEING SO RESTLESS THAT IT IS HARD TO SIT STILL: SEVERAL DAYS
6. BECOMING EASILY ANNOYED OR IRRITABLE: NEARLY EVERY DAY
7. FEELING AFRAID AS IF SOMETHING AWFUL MIGHT HAPPEN: NEARLY EVERY DAY
2. NOT BEING ABLE TO STOP OR CONTROL WORRYING: MORE THAN HALF THE DAYS
GAD7 TOTAL SCORE: 16
8. IF YOU CHECKED OFF ANY PROBLEMS, HOW DIFFICULT HAVE THESE MADE IT FOR YOU TO DO YOUR WORK, TAKE CARE OF THINGS AT HOME, OR GET ALONG WITH OTHER PEOPLE?: VERY DIFFICULT
GAD7 TOTAL SCORE: 16
1. FEELING NERVOUS, ANXIOUS, OR ON EDGE: MORE THAN HALF THE DAYS
GAD7 TOTAL SCORE: 16
4. TROUBLE RELAXING: MORE THAN HALF THE DAYS

## 2023-02-22 ASSESSMENT — PATIENT HEALTH QUESTIONNAIRE - PHQ9
SUM OF ALL RESPONSES TO PHQ QUESTIONS 1-9: 16
10. IF YOU CHECKED OFF ANY PROBLEMS, HOW DIFFICULT HAVE THESE PROBLEMS MADE IT FOR YOU TO DO YOUR WORK, TAKE CARE OF THINGS AT HOME, OR GET ALONG WITH OTHER PEOPLE: EXTREMELY DIFFICULT
SUM OF ALL RESPONSES TO PHQ QUESTIONS 1-9: 16

## 2023-02-22 ASSESSMENT — PAIN SCALES - GENERAL: PAINLEVEL: NO PAIN (0)

## 2023-02-22 NOTE — PATIENT INSTRUCTIONS
Continue Cymbalta.     Start Prozac.     Follow-up in 1 month.     Schedule lab work prior to 9 am at your convenience.

## 2023-02-22 NOTE — PROGRESS NOTES
Assessment & Plan   Moderate episode of recurrent major depressive disorder (H)  BRADEN (generalized anxiety disorder)  PHQ-9 and BRADEN-7 today are at 16. Patient feels like Cymbalta is no longer controlling symptoms and is interested in starting another antidepressant instead of increasing dose of Cymbalta. Given that he reported lack of motivation and fatigue, will start Prozac, which will be more activating for him. Follow up in 1 month.   - FLUoxetine (PROZAC) 10 MG capsule; Take 1 capsule (10 mg) by mouth daily  - DULoxetine (CYMBALTA) 60 MG capsule; Take 1 capsule (60 mg) by mouth daily    Other fatigue  Will check testosterone, vitamin D level, and TSH to rule out other causes of fatigue. Discussed that testosterone needs to be checked in the AM prior to 9 am.   - Testosterone Free and Total; Future  - TSH with free T4 reflex; Future  - Vitamin D Deficiency; Future    Alcohol consumption binge drinking  Patient is doing fine with Naltrexone with the exception of some nausea. Continue as prescribed.  - DULoxetine (CYMBALTA) 60 MG capsule; Take 1 capsule (60 mg) by mouth daily    Plantar warts  Exam was consistent with a common wart. Cryotherapy was done in the usual fashion without any complications.  - DESTRUCT BENIGN LESION, UP TO 14    Depression Screening Follow Up  PHQ 2/22/2023   PHQ-9 Total Score 16   Q9: Thoughts of better off dead/self-harm past 2 weeks Several days   F/U: Thoughts of suicide or self-harm Yes   F/U: Self harm-plan No   F/U: Self-harm action No   F/U: Safety concerns No     Last PHQ-9 2/22/2023   1.  Little interest or pleasure in doing things 2   2.  Feeling down, depressed, or hopeless 3   3.  Trouble falling or staying asleep, or sleeping too much 2   4.  Feeling tired or having little energy 3   5.  Poor appetite or overeating 1   6.  Feeling bad about yourself 2   7.  Trouble concentrating 1   8.  Moving slowly or restless 1   Q9: Thoughts of better off dead/self-harm past 2 weeks 1    PHQ-9 Total Score 16   Difficulty at work, home, or with people -   In the past two weeks have you had thoughts of suicide or self harm? Yes   Do you have concerns about your personal safety or the safety of others? No   In the past 2 weeks have you thought about a plan or had intention to harm yourself? No   In the past 2 weeks have you acted on these thoughts in any way? No         C-SSRS (Brief [place]) 2/22/2023   Within the last month, have you wished you were dead or wished you could go to sleep and not wake up? Yes   Within the last month, have you had any actual thoughts of killing yourself? Yes   Within the last month, have you been thinking about how you might do this? No   Within the last month, have you had these thoughts and had some intention of acting on them? No   Within the last month, have you started to work out or worked out the details of how to kill yourself with the intent to carry out this plan? No   Within the last month, have you ever done anything, started to do anything, or prepared to do anything to end your life? Yes, lifetime     Follow Up  Follow Up Actions Taken  Crisis resource information provided in the After Visit Summary  Patient to follow up with PCP.  Clinic staff to schedule appointment if able.    Sammy Stephenson is a 37 year old, presenting for the following health issues:  Depression and Anxiety    History of Present Illness       Mental Health Follow-up:  Patient presents to follow-up on Depression & Anxiety.Patient's depression since last visit has been:  Worse  The patient is not having other symptoms associated with depression.  Patient's anxiety since last visit has been:  No change  The patient is not having other symptoms associated with anxiety.  Any significant life events: job concerns  Patient is not feeling anxious or having panic attacks.  Patient has no concerns about alcohol or drug use.    Reason for visit:  Plantar wart   Symptom onset:  More than a  "month    He eats 2-3 servings of fruits and vegetables daily.He consumes 1 sweetened beverage(s) daily.He exercises with enough effort to increase his heart rate 9 or less minutes per day.  He exercises with enough effort to increase his heart rate 3 or less days per week.   He is taking medications regularly.    Today's PHQ-9         PHQ-9 Total Score: 16    PHQ-9 Q9 Thoughts of better off dead/self-harm past 2 weeks :   Several days  Thoughts of suicide or self harm: (P) Yes  Self-harm Plan:   (P) No  Self-harm Action:     (P) No  Safety concerns for self or others: (P) No    How difficult have these problems made it for you to do your work, take care of things at home, or get along with other people: Extremely difficult  Today's BRADEN-7 Score: 16     PHQ 8/15/2022 11/14/2022 2/22/2023   PHQ-9 Total Score 6 6 16   Q9: Thoughts of better off dead/self-harm past 2 weeks Not at all Not at all Several days   F/U: Thoughts of suicide or self-harm - - Yes   F/U: Self harm-plan - - No   F/U: Self-harm action - - No   F/U: Safety concerns - - No     BRADEN-7 SCORE 8/15/2022 11/14/2022 2/22/2023   Total Score - 8 (mild anxiety) 16 (severe anxiety)   Total Score 12 8 16     Depression  Was doing fine with Duloxetine initially, now feels like it's not working well  Seeing a therapist. Talks to her mostly about anxiety. Has another session tomorrow.    + SI, at the beginning of this week  Had a plan in the past (years ago): cutting himself with a knife.   Denies past attempt.  Endorsed difficulties at work    Plantar wart  - Left foot Would like it removed    Review of Systems   See HPI, otherwise negative      Objective    /84 (BP Location: Right arm, Patient Position: Sitting, Cuff Size: Adult Large)   Pulse 100   Temp 97.7  F (36.5  C) (Tympanic)   Resp 16   Ht 1.88 m (6' 2\")   Wt 112.9 kg (249 lb)   SpO2 96%   BMI 31.97 kg/m    Body mass index is 31.97 kg/m .  Physical Exam   Constitutional: healthy, alert, and no " distress  Head: Normocephalic. Atraumatic  Eyes: No conjunctival injection, sclera anicteric  Respiratory: No resp distress.  Musculoskeletal: extremities normal- no gross deformities noted, and normal muscle tone  Neurologic: Gait normal. CN 2-12 grossly intact  Psychiatric: mentation appears normal and affect normal/bright  Skin: plantar wart on left lateral foot. Otherwise no other lesions    ASHLI Escobedo Student  [facility]  February 22, 2023     Physician Attestation   I, Mukesh Carlson PA-C, was present with the medical/DAQUAN student who participated in the service and in the documentation of the note.  I have verified the history and personally performed the physical exam and medical decision making.  I agree with the assessment and plan of care as documented in the note.      LUPE LinderC

## 2023-03-16 ENCOUNTER — LAB (OUTPATIENT)
Dept: LAB | Facility: CLINIC | Age: 38
End: 2023-03-16
Payer: COMMERCIAL

## 2023-03-16 DIAGNOSIS — R53.83 OTHER FATIGUE: ICD-10-CM

## 2023-03-16 LAB — TSH SERPL DL<=0.005 MIU/L-ACNC: 2.2 UIU/ML (ref 0.3–4.2)

## 2023-03-16 PROCEDURE — 82306 VITAMIN D 25 HYDROXY: CPT

## 2023-03-16 PROCEDURE — 84403 ASSAY OF TOTAL TESTOSTERONE: CPT

## 2023-03-16 PROCEDURE — 36415 COLL VENOUS BLD VENIPUNCTURE: CPT

## 2023-03-16 PROCEDURE — 84443 ASSAY THYROID STIM HORMONE: CPT

## 2023-03-16 PROCEDURE — 84270 ASSAY OF SEX HORMONE GLOBUL: CPT

## 2023-03-17 LAB
DEPRECATED CALCIDIOL+CALCIFEROL SERPL-MC: 28 UG/L (ref 20–75)
SHBG SERPL-SCNC: 14 NMOL/L (ref 11–80)

## 2023-03-19 LAB
TESTOST FREE SERPL-MCNC: 4.95 NG/DL
TESTOST SERPL-MCNC: 175 NG/DL (ref 240–950)

## 2023-03-29 ENCOUNTER — VIRTUAL VISIT (OUTPATIENT)
Dept: FAMILY MEDICINE | Facility: CLINIC | Age: 38
End: 2023-03-29
Payer: COMMERCIAL

## 2023-03-29 DIAGNOSIS — F33.1 MODERATE EPISODE OF RECURRENT MAJOR DEPRESSIVE DISORDER (H): ICD-10-CM

## 2023-03-29 DIAGNOSIS — F41.1 GAD (GENERALIZED ANXIETY DISORDER): ICD-10-CM

## 2023-03-29 DIAGNOSIS — E70.1 PHENYLKETONURIA (PKU) (H): ICD-10-CM

## 2023-03-29 DIAGNOSIS — E29.1 HYPOGONADISM MALE: Primary | ICD-10-CM

## 2023-03-29 DIAGNOSIS — F10.10 ALCOHOL CONSUMPTION BINGE DRINKING: ICD-10-CM

## 2023-03-29 DIAGNOSIS — M08.3: ICD-10-CM

## 2023-03-29 PROCEDURE — 99214 OFFICE O/P EST MOD 30 MIN: CPT | Mod: VID | Performed by: PHYSICIAN ASSISTANT

## 2023-03-29 RX ORDER — DULOXETIN HYDROCHLORIDE 30 MG/1
30 CAPSULE, DELAYED RELEASE ORAL DAILY
Qty: 30 CAPSULE | Refills: 0 | Status: SHIPPED | OUTPATIENT
Start: 2023-03-29 | End: 2024-06-21 | Stop reason: ALTCHOICE

## 2023-03-29 RX ORDER — FLUOXETINE 10 MG/1
10 CAPSULE ORAL DAILY
Qty: 90 CAPSULE | Refills: 3 | Status: SHIPPED | OUTPATIENT
Start: 2023-03-29 | End: 2024-06-21 | Stop reason: DRUGHIGH

## 2023-03-29 ASSESSMENT — ANXIETY QUESTIONNAIRES
6. BECOMING EASILY ANNOYED OR IRRITABLE: SEVERAL DAYS
7. FEELING AFRAID AS IF SOMETHING AWFUL MIGHT HAPPEN: NOT AT ALL
7. FEELING AFRAID AS IF SOMETHING AWFUL MIGHT HAPPEN: NOT AT ALL
GAD7 TOTAL SCORE: 8
GAD7 TOTAL SCORE: 8
4. TROUBLE RELAXING: SEVERAL DAYS
2. NOT BEING ABLE TO STOP OR CONTROL WORRYING: MORE THAN HALF THE DAYS
5. BEING SO RESTLESS THAT IT IS HARD TO SIT STILL: SEVERAL DAYS
GAD7 TOTAL SCORE: 8
1. FEELING NERVOUS, ANXIOUS, OR ON EDGE: SEVERAL DAYS
3. WORRYING TOO MUCH ABOUT DIFFERENT THINGS: MORE THAN HALF THE DAYS

## 2023-03-29 ASSESSMENT — PATIENT HEALTH QUESTIONNAIRE - PHQ9
SUM OF ALL RESPONSES TO PHQ QUESTIONS 1-9: 5
SUM OF ALL RESPONSES TO PHQ QUESTIONS 1-9: 5

## 2023-03-29 NOTE — PATIENT INSTRUCTIONS
Try Mediplast for the plantar wart.     Over the next few weeks, lets increase your Prozac dose and decrease your Cymbalta dose. When you are ready, increase Prozac to 20 mg daily and decrease Cymbalta to 30 mg daily. Wait 1 month, and if doing well, you can stop Cymbalta altogether. Then follow-up with me at that time to see how things are going.     Come back in for a testosterone check in the am when able.

## 2023-03-29 NOTE — PROGRESS NOTES
Sachin is a 37 year old who is being evaluated via a billable video visit.      How would you like to obtain your AVS? MyChart  If the video visit is dropped, the invitation should be resent by: my chart   Will anyone else be joining your video visit? No    Assessment & Plan   Moderate episode of recurrent major depressive disorder (H)  BRADEN (generalized anxiety disorder)  Symptoms improved with starting Prozac and therapy. We will attempt to control his symptoms on Prozac alone so we will increase to 20 mg and decrease his Cymbalta. Follow-up after a few months of cross titration.   - FLUoxetine (PROZAC) 10 MG capsule; Take 1 capsule (10 mg) by mouth daily  - DULoxetine (CYMBALTA) 30 MG capsule; Take 1 capsule (30 mg) by mouth daily    Alcohol consumption binge drinking  Significant decrease in alcohol use. Uses Naltrexone as needed. Therapy has been helpful as well. Continue these.   - DULoxetine (CYMBALTA) 30 MG capsule; Take 1 capsule (30 mg) by mouth daily    Hypogonadism male  Low testosterone with symptoms. Will ensure low lab one more time before treating. We did discuss risks, benefit, side effects and monitoring and administration of testosterone. Answered all questions and pt verbalized understanding.   - Testosterone Free and Total; Future    Juvenile seronegative polyarthritis (H)  Currently treating with NSAIDs alone. Previously seen by [facility]. Trials Plaquenil and Piroxicam but stopped. Continue NSAIDs for now.     Phenylketonuria (PKU) (H)  Managed by genetics. Levels have been stable on current therapy. Continue for now.     OSBALDO Linder [facility]    Subjective   Sachin is a 37 year old, presenting for the following health issues:  Anxiety (/) and Depression    Additional Questions 3/29/2023   Roomed by Ginny ECHEVARRIA CMA     Patient Reported Additional Medications 3/29/2023   Patient reports taking the following new medications no     History of Present  Illness       Mental Health Follow-up:  Patient presents to follow-up on Depression & Anxiety.Patient's depression since last visit has been:  Better  The patient is not having other symptoms associated with depression.  Patient's anxiety since last visit has been:  Better  The patient is not having other symptoms associated with anxiety.  Any significant life events: No  Patient is feeling anxious or having panic attacks.  Patient has no concerns about alcohol or drug use.    Reason for visit:  Blood test results    He eats 2-3 servings of fruits and vegetables daily.He consumes 1 sweetened beverage(s) daily.He exercises with enough effort to increase his heart rate 10 to 19 minutes per day.  He exercises with enough effort to increase his heart rate 3 or less days per week.   He is taking medications regularly.    Today's PHQ-9         PHQ-9 Total Score: 5    PHQ-9 Q9 Thoughts of better off dead/self-harm past 2 weeks :   Not at all      Today's BRADEN-7 Score: 8     PHQ 11/14/2022 2/22/2023 3/29/2023   PHQ-9 Total Score 6 16 5   Q9: Thoughts of better off dead/self-harm past 2 weeks Not at all Several days Not at all   F/U: Thoughts of suicide or self-harm - Yes -   F/U: Self harm-plan - No -   F/U: Self-harm action - No -   F/U: Safety concerns - No -     BRADEN-7 SCORE 11/14/2022 2/22/2023 3/29/2023   Total Score 8 (mild anxiety) 16 (severe anxiety) 8 (mild anxiety)   Total Score 8 16 8     Review of Systems   See HPI         Objective       Vitals:  No vitals were obtained today due to virtual visit.    Physical Exam   GENERAL: Healthy, alert and no distress  EYES: Eyes grossly normal to inspection.  No discharge or erythema, or obvious scleral/conjunctival abnormalities.  RESP: No audible wheeze, cough, or visible cyanosis.  No visible retractions or increased work of breathing.    SKIN: Visible skin clear. No significant rash, abnormal pigmentation or lesions.  NEURO: Cranial nerves grossly intact.  Mentation and  speech appropriate for age.  PSYCH: Mentation appears normal, affect normal/bright, judgement and insight intact, normal speech and appearance well-groomed.      Video-Visit Details    Type of service:  Video Visit     Originating Location (pt. Location): Home  Distant Location (provider location):  On-site  Platform used for Video Visit: [employer]

## 2023-04-08 ENCOUNTER — LAB (OUTPATIENT)
Dept: LAB | Facility: CLINIC | Age: 38
End: 2023-04-08
Payer: COMMERCIAL

## 2023-04-08 DIAGNOSIS — E29.1 HYPOGONADISM MALE: ICD-10-CM

## 2023-04-08 PROCEDURE — 84403 ASSAY OF TOTAL TESTOSTERONE: CPT

## 2023-04-08 PROCEDURE — 36415 COLL VENOUS BLD VENIPUNCTURE: CPT

## 2023-04-08 PROCEDURE — 84270 ASSAY OF SEX HORMONE GLOBUL: CPT

## 2023-04-10 LAB — SHBG SERPL-SCNC: 14 NMOL/L (ref 11–80)

## 2023-04-11 LAB
TESTOST FREE SERPL-MCNC: 4.21 NG/DL
TESTOST SERPL-MCNC: 150 NG/DL (ref 240–950)

## 2023-04-12 DIAGNOSIS — E29.1 HYPOGONADISM MALE: Primary | ICD-10-CM

## 2023-04-12 RX ORDER — TESTOSTERONE GEL, 1% 10 MG/G
4 GEL TRANSDERMAL DAILY
Qty: 75 G | Refills: 1 | Status: SHIPPED | OUTPATIENT
Start: 2023-04-12 | End: 2023-06-12

## 2023-04-14 ENCOUNTER — MYC MEDICAL ADVICE (OUTPATIENT)
Dept: FAMILY MEDICINE | Facility: CLINIC | Age: 38
End: 2023-04-14

## 2023-04-14 DIAGNOSIS — F41.1 GAD (GENERALIZED ANXIETY DISORDER): ICD-10-CM

## 2023-04-14 DIAGNOSIS — F33.1 MODERATE EPISODE OF RECURRENT MAJOR DEPRESSIVE DISORDER (H): Primary | ICD-10-CM

## 2023-04-14 NOTE — TELEPHONE ENCOUNTER
Pls see [employer] message from 4/14/23.    RX pended and message routed to provider for consideration.     Julie Behrendt RN

## 2023-07-20 ENCOUNTER — TELEPHONE (OUTPATIENT)
Dept: FAMILY MEDICINE | Facility: CLINIC | Age: 38
End: 2023-07-20

## 2023-07-20 NOTE — TELEPHONE ENCOUNTER
Patient Quality Outreach    Patient is due for the following:   Depression  -  PHQ-9 needed  Physical Preventive Adult Physical    Next Steps:   Schedule a Adult Preventative    Type of outreach:    Sent [employer] message.      Questions for provider review:    None           Bailee Kahler

## 2023-07-21 ENCOUNTER — MYC MEDICAL ADVICE (OUTPATIENT)
Dept: FAMILY MEDICINE | Facility: CLINIC | Age: 38
End: 2023-07-21

## 2023-07-21 DIAGNOSIS — E29.1 HYPOGONADISM MALE: ICD-10-CM

## 2023-07-21 RX ORDER — TESTOSTERONE GEL, 1% 10 MG/G
GEL TRANSDERMAL
Qty: 150 G | Refills: 0 | Status: SHIPPED | OUTPATIENT
Start: 2023-07-21 | End: 2023-09-21

## 2023-08-19 ENCOUNTER — HEALTH MAINTENANCE LETTER (OUTPATIENT)
Age: 38
End: 2023-08-19

## 2023-09-18 ENCOUNTER — DOCUMENTATION ONLY (OUTPATIENT)
Dept: FAMILY MEDICINE | Facility: CLINIC | Age: 38
End: 2023-09-18

## 2023-09-18 ENCOUNTER — LAB (OUTPATIENT)
Dept: LAB | Facility: CLINIC | Age: 38
End: 2023-09-18
Payer: COMMERCIAL

## 2023-09-18 DIAGNOSIS — E29.1 HYPOGONADISM MALE: Primary | ICD-10-CM

## 2023-09-18 DIAGNOSIS — E29.1 HYPOGONADISM MALE: ICD-10-CM

## 2023-09-18 PROCEDURE — 36415 COLL VENOUS BLD VENIPUNCTURE: CPT

## 2023-09-18 PROCEDURE — 84270 ASSAY OF SEX HORMONE GLOBUL: CPT

## 2023-09-18 PROCEDURE — 84403 ASSAY OF TOTAL TESTOSTERONE: CPT

## 2023-09-18 NOTE — PROGRESS NOTES
Patient is coming for labs, appears to be a testosterone level, please place future orders or contact patient.  Thanks!

## 2023-09-20 LAB — SHBG SERPL-SCNC: 14 NMOL/L (ref 11–80)

## 2023-09-21 DIAGNOSIS — E29.1 HYPOGONADISM MALE: ICD-10-CM

## 2023-09-21 LAB
TESTOST FREE SERPL-MCNC: 6.73 NG/DL
TESTOST SERPL-MCNC: 234 NG/DL (ref 240–950)

## 2023-09-21 RX ORDER — TESTOSTERONE GEL, 1% 10 MG/G
GEL TRANSDERMAL
Qty: 150 G | Refills: 2 | Status: SHIPPED | OUTPATIENT
Start: 2023-09-21 | End: 2024-02-13

## 2024-02-10 NOTE — TELEPHONE ENCOUNTER
PHE result collected 3/12/18 given to patient via email = 11.8 mg/dL.  Noted that visit notes from Monday's appointment have been sent to [facility] as requested to obtain insurance coverage for formula.  
,

## 2024-02-12 ENCOUNTER — MYC REFILL (OUTPATIENT)
Dept: FAMILY MEDICINE | Facility: CLINIC | Age: 39
End: 2024-02-12
Payer: COMMERCIAL

## 2024-02-12 DIAGNOSIS — E29.1 HYPOGONADISM MALE: ICD-10-CM

## 2024-02-12 DIAGNOSIS — F33.1 MODERATE EPISODE OF RECURRENT MAJOR DEPRESSIVE DISORDER (H): ICD-10-CM

## 2024-02-12 DIAGNOSIS — F41.1 GAD (GENERALIZED ANXIETY DISORDER): ICD-10-CM

## 2024-02-13 RX ORDER — TESTOSTERONE GEL, 1% 10 MG/G
GEL TRANSDERMAL
Qty: 150 G | Refills: 2 | OUTPATIENT
Start: 2024-02-13

## 2024-02-13 NOTE — TELEPHONE ENCOUNTER
Future Office Visit:   Next 5 appointments (look out 90 days)      Feb 29, 2024  7:00 AM  (Arrive by 6:45 AM)  Provider Visit with OSBALDO Linder [facility] ([facility] ) [address] [place] [id number]  [phone]

## 2024-02-13 NOTE — TELEPHONE ENCOUNTER
Left message for patient to call care team.   Patient requesting testosterone gel and Prozac 20MG

## 2024-02-14 RX ORDER — TESTOSTERONE GEL, 1% 10 MG/G
GEL TRANSDERMAL
Qty: 150 G | Refills: 0 | Status: SHIPPED | OUTPATIENT
Start: 2024-02-14 | End: 2024-02-29

## 2024-02-29 ENCOUNTER — OFFICE VISIT (OUTPATIENT)
Dept: FAMILY MEDICINE | Facility: CLINIC | Age: 39
End: 2024-02-29
Payer: COMMERCIAL

## 2024-02-29 VITALS
HEART RATE: 84 BPM | SYSTOLIC BLOOD PRESSURE: 122 MMHG | RESPIRATION RATE: 16 BRPM | BODY MASS INDEX: 30.8 KG/M2 | HEIGHT: 74 IN | WEIGHT: 240 LBS | DIASTOLIC BLOOD PRESSURE: 78 MMHG | TEMPERATURE: 97.9 F | OXYGEN SATURATION: 97 %

## 2024-02-29 DIAGNOSIS — Z30.2 ENCOUNTER FOR VASECTOMY: ICD-10-CM

## 2024-02-29 DIAGNOSIS — E29.1 HYPOGONADISM MALE: Primary | ICD-10-CM

## 2024-02-29 DIAGNOSIS — F33.1 MODERATE EPISODE OF RECURRENT MAJOR DEPRESSIVE DISORDER (H): ICD-10-CM

## 2024-02-29 DIAGNOSIS — F41.1 GAD (GENERALIZED ANXIETY DISORDER): ICD-10-CM

## 2024-02-29 LAB
ANION GAP SERPL CALCULATED.3IONS-SCNC: 11 MMOL/L (ref 7–15)
BUN SERPL-MCNC: 8.4 MG/DL (ref 6–20)
CALCIUM SERPL-MCNC: 9.4 MG/DL (ref 8.6–10)
CHLORIDE SERPL-SCNC: 106 MMOL/L (ref 98–107)
CREAT SERPL-MCNC: 0.8 MG/DL (ref 0.67–1.17)
DEPRECATED HCO3 PLAS-SCNC: 26 MMOL/L (ref 22–29)
EGFRCR SERPLBLD CKD-EPI 2021: >90 ML/MIN/1.73M2
ERYTHROCYTE [DISTWIDTH] IN BLOOD BY AUTOMATED COUNT: 12 % (ref 10–15)
GLUCOSE SERPL-MCNC: 101 MG/DL (ref 70–99)
HCT VFR BLD AUTO: 43.8 % (ref 40–53)
HGB BLD-MCNC: 14.6 G/DL (ref 13.3–17.7)
MCH RBC QN AUTO: 30.6 PG (ref 26.5–33)
MCHC RBC AUTO-ENTMCNC: 33.3 G/DL (ref 31.5–36.5)
MCV RBC AUTO: 92 FL (ref 78–100)
PLATELET # BLD AUTO: 326 10E3/UL (ref 150–450)
POTASSIUM SERPL-SCNC: 4.5 MMOL/L (ref 3.4–5.3)
PSA SERPL DL<=0.01 NG/ML-MCNC: 0.26 NG/ML
RBC # BLD AUTO: 4.77 10E6/UL (ref 4.4–5.9)
SODIUM SERPL-SCNC: 143 MMOL/L (ref 135–145)
WBC # BLD AUTO: 5.1 10E3/UL (ref 4–11)

## 2024-02-29 PROCEDURE — 85027 COMPLETE CBC AUTOMATED: CPT | Performed by: PHYSICIAN ASSISTANT

## 2024-02-29 PROCEDURE — 80048 BASIC METABOLIC PNL TOTAL CA: CPT | Performed by: PHYSICIAN ASSISTANT

## 2024-02-29 PROCEDURE — 36415 COLL VENOUS BLD VENIPUNCTURE: CPT | Performed by: PHYSICIAN ASSISTANT

## 2024-02-29 PROCEDURE — 99214 OFFICE O/P EST MOD 30 MIN: CPT | Performed by: PHYSICIAN ASSISTANT

## 2024-02-29 PROCEDURE — 84270 ASSAY OF SEX HORMONE GLOBUL: CPT | Performed by: PHYSICIAN ASSISTANT

## 2024-02-29 PROCEDURE — 84403 ASSAY OF TOTAL TESTOSTERONE: CPT | Performed by: PHYSICIAN ASSISTANT

## 2024-02-29 PROCEDURE — 84153 ASSAY OF PSA TOTAL: CPT | Performed by: PHYSICIAN ASSISTANT

## 2024-02-29 RX ORDER — TESTOSTERONE GEL, 1% 10 MG/G
GEL TRANSDERMAL
Qty: 150 G | Refills: 0 | Status: CANCELLED | OUTPATIENT
Start: 2024-02-29

## 2024-02-29 RX ORDER — TESTOSTERONE GEL, 1% 10 MG/G
GEL TRANSDERMAL
Qty: 225 G | Refills: 0 | Status: SHIPPED | OUTPATIENT
Start: 2024-02-29 | End: 2024-05-02

## 2024-02-29 ASSESSMENT — ANXIETY QUESTIONNAIRES
8. IF YOU CHECKED OFF ANY PROBLEMS, HOW DIFFICULT HAVE THESE MADE IT FOR YOU TO DO YOUR WORK, TAKE CARE OF THINGS AT HOME, OR GET ALONG WITH OTHER PEOPLE?: SOMEWHAT DIFFICULT
GAD7 TOTAL SCORE: 9
7. FEELING AFRAID AS IF SOMETHING AWFUL MIGHT HAPPEN: SEVERAL DAYS
GAD7 TOTAL SCORE: 9

## 2024-02-29 ASSESSMENT — PATIENT HEALTH QUESTIONNAIRE - PHQ9
SUM OF ALL RESPONSES TO PHQ QUESTIONS 1-9: 4
10. IF YOU CHECKED OFF ANY PROBLEMS, HOW DIFFICULT HAVE THESE PROBLEMS MADE IT FOR YOU TO DO YOUR WORK, TAKE CARE OF THINGS AT HOME, OR GET ALONG WITH OTHER PEOPLE: SOMEWHAT DIFFICULT

## 2024-02-29 NOTE — PROGRESS NOTES
"  Assessment & Plan     Hypogonadism male  Pt is tolerating the testosterone gel well. Check basic labs today. Pt to continue on the gel at this time.   - Testosterone Free and Total; Future  - CBC with platelets; Future  - PSA, tumor marker; Future  - Basic metabolic panel  (Ca, Cl, CO2, Creat, Gluc, K, Na, BUN); Future  - testosterone (ANDROGEL 1 % PUMP) 12.5 MG/ACT (1%) gel; USE 5 PUMPS ON SKIN OF SHOULDER,UPPER ARM, OR ABDOMEN ONCE DAILY    Moderate episode of recurrent major depressive disorder (H)  BRADEN (generalized anxiety disorder)  Patient is taking 20mg of fluoxetine and tolerating this well. Feels like the medication is working well for him. No medication changes today. Will renew patient's prescription and refill his medication. Encouraged pt to make an appointment with his therapist as he reports this was beneficial in the past. Discussed the benefits of using a light therapy lamp, with 10,000 lux, and he was open to trying this.   - FLUoxetine (PROZAC) 20 MG capsule; Take 1 capsule (20 mg) by mouth daily    Encounter for vasectomy  Pt is interested in receiving this procedure. Referred him to urology for consult and further management.   - Adult Urology  Referral; Future    BMI  Estimated body mass index is 30.61 kg/m  as calculated from the following:    Height as of this encounter: 1.886 m (6' 2.25\").    Weight as of this encounter: 108.9 kg (240 lb).       FUTURE APPOINTMENTS:       - Follow-up visit in 6 months  See Patient Instructions      Sammy Stephenson is a 38 year old, presenting for the following health issues:  Depression and Recheck Medication        2/29/2024     6:56 AM   Additional Questions   Roomed by Brandi OLEARY   Accompanied by Self     History of Present Illness       Reason for visit:  Low testosterone and depression check-up    He eats 2-3 servings of fruits and vegetables daily.He consumes 1 sweetened beverage(s) daily.He exercises with enough effort to increase his " heart rate 10 to 19 minutes per day.  He exercises with enough effort to increase his heart rate 3 or less days per week.   He is taking medications regularly.         Depression Followup  How are you doing with your depression since your last visit? Better side  Are you having other symptoms that might be associated with depression? No  Have you had a significant life event?  No   Are you feeling anxious or having panic attacks?   No  Do you have any concerns with your use of alcohol or other drugs? No    Social History     Tobacco Use    Smoking status: Former     Types: Cigarettes    Smokeless tobacco: Former     Types: Chew    Tobacco comments:     smoked for 7 years; smoked 1/2 ppd   Vaping Use    Vaping Use: Never used   Substance Use Topics    Alcohol use: Yes     Comment: weekends    Drug use: No         3/29/2023     1:42 PM 7/21/2023     7:05 AM 2/29/2024     6:47 AM   PHQ   PHQ-9 Total Score 5 1 4   Q9: Thoughts of better off dead/self-harm past 2 weeks Not at all Not at all Not at all         3/29/2023     1:42 PM 7/21/2023     7:04 AM 2/29/2024     6:48 AM   BRADEN-7 SCORE   Total Score 8 (mild anxiety) 10 (moderate anxiety) 9 (mild anxiety)   Total Score 8 10 9         2/29/2024     6:47 AM   Last PHQ-9   1.  Little interest or pleasure in doing things 1   2.  Feeling down, depressed, or hopeless 0   3.  Trouble falling or staying asleep, or sleeping too much 0   4.  Feeling tired or having little energy 1   5.  Poor appetite or overeating 0   6.  Feeling bad about yourself 1   7.  Trouble concentrating 1   8.  Moving slowly or restless 0   Q9: Thoughts of better off dead/self-harm past 2 weeks 0   PHQ-9 Total Score 4         2/29/2024     6:48 AM   BRADEN-7    1. Feeling nervous, anxious, or on edge 1   2. Not being able to stop or control worrying 2   3. Worrying too much about different things 2   4. Trouble relaxing 1   5. Being so restless that it is hard to sit still 1   6. Becoming easily annoyed or  "irritable 1   7. Feeling afraid, as if something awful might happen 1   BRADEN-7 Total Score 9   If you checked any problems, how difficult have they made it for you to do your work, take care of things at home, or get along with other people? Somewhat difficult       Suicide Assessment Five-step Evaluation and Treatment (SAFE-T)      Medication Followup of Testosterone Gel  Taking Medication as prescribed: yes  Side Effects:  None  Medication Helping Symptoms:  think so        Objective    /78 (BP Location: Right arm, Patient Position: Sitting, Cuff Size: Adult Regular)   Pulse 84   Temp 97.9  F (36.6  C) (Tympanic)   Resp 16   Ht 1.886 m (6' 2.25\")   Wt 108.9 kg (240 lb)   SpO2 97%   BMI 30.61 kg/m    Body mass index is 30.61 kg/m .  Physical Exam   GENERAL: alert and no distress  EYES: Eyes grossly normal to inspection, conjunctivae and sclerae normal  HENT: ear canals and TM's normal, nose and mouth without ulcers or lesions  NECK: no adenopathy, no asymmetry, masses, or scars  RESP: lungs clear to auscultation - no rales, rhonchi or wheezes  CV: regular rate and rhythm, normal S1 S2, no S3 or S4, no murmur, click or rub, no peripheral edema  MS: no gross musculoskeletal defects noted, no edema  SKIN: no suspicious lesions or rashes  NEURO: Normal strength and tone, mentation intact and speech normal  PSYCH: mentation appears normal, affect normal/bright      Anupama CORNELIUS student  02/29/24    Physician Attestation   I, Mukesh Carlson PA-C, was present with the medical/DAQUAN student who participated in the service and in the documentation of the note.  I have verified the history and personally performed the physical exam and medical decision making.  I agree with the assessment and plan of care as documented in the note.        Mukesh Carlson PA-C   "

## 2024-02-29 NOTE — PATIENT INSTRUCTIONS
Continue using your medications as prescribed.     Consider using a light therapy lamp with 10,000 lux in the morning for about 30 minutes. Do not look directly at the lamp, but rather have it in your periphery.    Schedule an appointment to follow up with your therapist.    Follow up with urology for vasectomy consult.     Return in 6 months for re-evaluation.

## 2024-03-01 LAB — SHBG SERPL-SCNC: 14 NMOL/L (ref 11–80)

## 2024-03-03 LAB
TESTOST FREE SERPL-MCNC: 9.23 NG/DL
TESTOST SERPL-MCNC: 314 NG/DL (ref 240–950)

## 2024-05-02 ENCOUNTER — MYC REFILL (OUTPATIENT)
Dept: FAMILY MEDICINE | Facility: CLINIC | Age: 39
End: 2024-05-02
Payer: COMMERCIAL

## 2024-05-02 DIAGNOSIS — E29.1 HYPOGONADISM MALE: ICD-10-CM

## 2024-05-03 RX ORDER — TESTOSTERONE GEL, 1% 10 MG/G
GEL TRANSDERMAL
Qty: 225 G | Refills: 2 | Status: SHIPPED | OUTPATIENT
Start: 2024-05-03

## 2024-05-03 NOTE — TELEPHONE ENCOUNTER
Pending Prescriptions:                       Disp   Refills    testosterone (ANDROGEL 1 % PUMP) 12.5 MG/A*225 g  0        Sig: USE 5 PUMPS ON SKIN OF SHOULDER,UPPER ARM, OR ABDOMEN           ONCE DAILY    Routing refill request to provider for review/approval because:  Androgen Agents [id number]/02/2024 09:15 PM   Protocol Details   Lipid panel on file in past 12 mos    ALT on file within past 12 mos    Refills for this classification require provider review    AST on file within past 12 mos    Patient is of age 12 and older    Medication is active on med list    HCT less than 54% on file within past 12 mos    Serum Testosterone on file within past 12 mos    Blood pressure under 140/90 in past 6 months    Patient is not pregnant    No positive pregnancy test on file within past 12 mos    Recent (6 mo) or future (30 days) visit within the authorizing provider's specialty     Adina Orta RN  [facility]

## 2024-06-01 ENCOUNTER — MYC REFILL (OUTPATIENT)
Dept: FAMILY MEDICINE | Facility: CLINIC | Age: 39
End: 2024-06-01
Payer: COMMERCIAL

## 2024-06-01 DIAGNOSIS — F33.1 MODERATE EPISODE OF RECURRENT MAJOR DEPRESSIVE DISORDER (H): ICD-10-CM

## 2024-06-01 DIAGNOSIS — E29.1 HYPOGONADISM MALE: ICD-10-CM

## 2024-06-01 DIAGNOSIS — F41.1 GAD (GENERALIZED ANXIETY DISORDER): ICD-10-CM

## 2024-06-01 RX ORDER — TESTOSTERONE GEL, 1% 10 MG/G
GEL TRANSDERMAL
Qty: 225 G | Refills: 2 | Status: CANCELLED | OUTPATIENT
Start: 2024-06-01

## 2024-06-21 ENCOUNTER — OFFICE VISIT (OUTPATIENT)
Dept: FAMILY MEDICINE | Facility: CLINIC | Age: 39
End: 2024-06-21
Payer: COMMERCIAL

## 2024-06-21 VITALS
BODY MASS INDEX: 31.32 KG/M2 | SYSTOLIC BLOOD PRESSURE: 104 MMHG | TEMPERATURE: 97.1 F | OXYGEN SATURATION: 97 % | HEART RATE: 60 BPM | WEIGHT: 244 LBS | DIASTOLIC BLOOD PRESSURE: 84 MMHG | RESPIRATION RATE: 14 BRPM | HEIGHT: 74 IN

## 2024-06-21 DIAGNOSIS — L72.9 SKIN CYST: Primary | ICD-10-CM

## 2024-06-21 DIAGNOSIS — B07.0 PLANTAR WARTS: ICD-10-CM

## 2024-06-21 DIAGNOSIS — F33.1 MODERATE EPISODE OF RECURRENT MAJOR DEPRESSIVE DISORDER (H): ICD-10-CM

## 2024-06-21 DIAGNOSIS — M08.3: ICD-10-CM

## 2024-06-21 DIAGNOSIS — E70.0 CLASSICAL PHENYLKETONURIA (H): ICD-10-CM

## 2024-06-21 DIAGNOSIS — E29.1 HYPOGONADISM MALE: ICD-10-CM

## 2024-06-21 PROBLEM — F33.9 MAJOR DEPRESSION, RECURRENT (H): Status: ACTIVE | Noted: 2024-06-21

## 2024-06-21 PROBLEM — F32.9 MAJOR DEPRESSION: Status: RESOLVED | Noted: 2019-06-24 | Resolved: 2024-06-21

## 2024-06-21 LAB
ERYTHROCYTE [DISTWIDTH] IN BLOOD BY AUTOMATED COUNT: 12.1 % (ref 10–15)
HCT VFR BLD AUTO: 43.7 % (ref 40–53)
HGB BLD-MCNC: 14.7 G/DL (ref 13.3–17.7)
MCH RBC QN AUTO: 30.5 PG (ref 26.5–33)
MCHC RBC AUTO-ENTMCNC: 33.6 G/DL (ref 31.5–36.5)
MCV RBC AUTO: 91 FL (ref 78–100)
PLATELET # BLD AUTO: 313 10E3/UL (ref 150–450)
RBC # BLD AUTO: 4.82 10E6/UL (ref 4.4–5.9)
SHBG SERPL-SCNC: 16 NMOL/L (ref 11–80)
WBC # BLD AUTO: 5.9 10E3/UL (ref 4–11)

## 2024-06-21 PROCEDURE — 17110 DESTRUCTION B9 LES UP TO 14: CPT | Performed by: PHYSICIAN ASSISTANT

## 2024-06-21 PROCEDURE — 99214 OFFICE O/P EST MOD 30 MIN: CPT | Mod: 25 | Performed by: PHYSICIAN ASSISTANT

## 2024-06-21 PROCEDURE — 84270 ASSAY OF SEX HORMONE GLOBUL: CPT | Performed by: PHYSICIAN ASSISTANT

## 2024-06-21 PROCEDURE — 36415 COLL VENOUS BLD VENIPUNCTURE: CPT | Performed by: PHYSICIAN ASSISTANT

## 2024-06-21 PROCEDURE — 84403 ASSAY OF TOTAL TESTOSTERONE: CPT | Performed by: PHYSICIAN ASSISTANT

## 2024-06-21 PROCEDURE — 85027 COMPLETE CBC AUTOMATED: CPT | Performed by: PHYSICIAN ASSISTANT

## 2024-06-21 ASSESSMENT — PAIN SCALES - GENERAL: PAINLEVEL: NO PAIN (0)

## 2024-06-21 NOTE — PROGRESS NOTES
"  Assessment & Plan   Skin cyst  Left anterior neck. Chronic. No evidence of infection. Able to express discharge occasionally. Shared decision making discussed. Pt will monitor and follow-up as needed with Derm for excision if he would like it done.   - Adult Dermatology  Referral; Future    Plantar warts  One wart on the foot and one on the left thumb. Cryotherapy done in the usual fashion.   - DESTRUCT BENIGN LESION, UP TO 14    Hypogonadism male  Due for lab recheck. Tolerating Androgel well.   - Testosterone Free and Total; Future  - CBC with platelets; Future  - CBC with platelets  - Testosterone Free and Total    Moderate episode of recurrent major depressive disorder (H)  Doing great after switching from Duloxetine to Prozac. Continue this for now.     Juvenile seronegative polyarthritis (H)  Currently treating with NSAIDs alone. Previously seen by [facility]. Trials Plaquenil and Piroxicam but stopped. Continue NSAIDs for now.     Classical phenylketonuria (H24)  Managed by genetics. Levels have been stable on current therapy. Continue for now.     The longitudinal plan of care for the diagnosis(es)/condition(s) as documented were addressed during this visit. Due to the added complexity in care, I will continue to support Sachin in the subsequent management and with ongoing continuity of care.     BMI  Estimated body mass index is 31.33 kg/m  as calculated from the following:    Height as of this encounter: 1.88 m (6' 2\").    Weight as of this encounter: 110.7 kg (244 lb).     Subjective   Sachin is a 38 year old, presenting for the following health issues:  Neck Problem (Possible cyst)        6/21/2024     7:11 AM   Additional Questions   Roomed by Linda WYATT   Accompanied by self         6/21/2024     7:11 AM   Patient Reported Additional Medications   Patient reports taking the following new medications .     History of Present Illness       Reason for visit:  Cyst lump on neck  Symptom " "onset:  More than a month  Symptoms include:  Na  Symptom intensity:  Moderate  Symptom progression:  Staying the same  Had these symptoms before:  Yes  Has tried/received treatment for these symptoms:  Yes  Previous treatment was successful:  No  What makes it worse:  No  What makes it better:  No    He eats 0-1 servings of fruits and vegetables daily.He consumes 1 sweetened beverage(s) daily.He exercises with enough effort to increase his heart rate 10 to 19 minutes per day.  He exercises with enough effort to increase his heart rate 3 or less days per week. He is missing 1 dose(s) of medications per week.  He is not taking prescribed medications regularly due to remembering to take.     Has had cyst for years  Comes and goes  Now is the largest it has been before  Has tried to get fluid out of it  Left side anterior      Review of Systems  See HPI       Objective    /84   Pulse 60   Temp 97.1  F (36.2  C) (Tympanic)   Resp 14   Ht 1.88 m (6' 2\")   Wt 110.7 kg (244 lb)   SpO2 97%   BMI 31.33 kg/m    Body mass index is 31.33 kg/m .  Physical Exam   Constitutional: healthy, alert, and no distress  Head: Normocephalic. Atraumatic  Eyes: No conjunctival injection, sclera anicteric  Neck: supple, no thyromegaly, nodules or asymmetry of the thyroid. No cervical LAD.  Cardiovascular: RRR. No murmurs, clicks, gallops, or rubs. No peripheral edema.   Respiratory: No resp distress. Lungs CTAB bilaterally.   Musculoskeletal: extremities normal- no gross deformities noted, and normal muscle tone  Skin: 1 calluses wart located on the lateral left foot. 1 small wart located on the dorsal left thumb. Small 1 cm cystic lesion within the skin without tenderness or erythema. Able to express stringy white discharge.   Neurologic: Gait normal. CN 2-12 grossly intact  Psychiatric: mentation appears normal and affect normal/bright         Physician Attestation   I, Mukesh Carlson PA-C, was present with the medical/DAQUAN " student who participated in the service and in the documentation of the note.  I have verified the history and personally performed the physical exam and medical decision making.  I agree with the assessment and plan of care as documented in the note.      Mukesh Carlson PA-C

## 2024-06-21 NOTE — NURSING NOTE
"  Subjective:      34 y.o. male presents to urgent care for cold symptoms that started one week ago. He is experiencing headache, increased sinus pressure, sore throat, cough, and diarrhea. Heart rate is elevated today in urgent care.  He denies any chest pain, palpitations, or shortness of breath.  No tobacco product use.  No history of asthma or COPD.  He is vaccinated against COVID.  No known sick contacts.    He denies any other questions or concerns at this time.    Current problem list, medication, and past medical/surgical history were reviewed in Epic.    ROS  See HPI     Objective:      /84 (BP Location: Right arm, Patient Position: Sitting, BP Cuff Size: Large adult long)   Pulse (!) 121   Temp 36 °C (96.8 °F) (Temporal)   Resp 16   Ht 1.6 m (5' 3\")   Wt 110 kg (243 lb 6.4 oz)   SpO2 98%   BMI 43.12 kg/m²     Physical Exam  Constitutional:       General: He is not in acute distress.     Appearance: He is not diaphoretic.   HENT:      Right Ear: Tympanic membrane, ear canal and external ear normal.      Left Ear: Tympanic membrane, ear canal and external ear normal.      Nose:      Right Sinus: Frontal sinus tenderness present. No maxillary sinus tenderness.      Left Sinus: Frontal sinus tenderness present. No maxillary sinus tenderness.      Mouth/Throat:      Tongue: Tongue does not deviate from midline.      Palate: No lesions.      Pharynx: Uvula midline. No posterior oropharyngeal erythema.      Tonsils: No tonsillar exudate.   Cardiovascular:      Rate and Rhythm: Regular rhythm. Tachycardia present.      Heart sounds: Normal heart sounds.   Pulmonary:      Effort: Pulmonary effort is normal. No respiratory distress.      Breath sounds: Normal breath sounds.   Neurological:      Mental Status: He is alert.   Psychiatric:         Mood and Affect: Affect normal.         Judgment: Judgment normal.       Assessment/Plan:     1. Bacterial sinusitis  2. Tachycardia  Systemic symptoms seen " "Chief Complaint   Patient presents with    Neck Problem     Possible cyst     /84   Pulse 60   Temp 97.1  F (36.2  C) (Tympanic)   Resp 14   Ht 1.88 m (6' 2\")   Wt 110.7 kg (244 lb)   SpO2 97%   BMI 31.33 kg/m   Estimated body mass index is 31.33 kg/m  as calculated from the following:    Height as of this encounter: 1.88 m (6' 2\").    Weight as of this encounter: 110.7 kg (244 lb).  Patient presents to the clinic using No DME      Health Maintenance that is potentially due pending provider review:    Health Maintenance Due   Topic Date Due    DEPRESSION ACTION PLAN  Never done    IPV IMMUNIZATION (3 of 3 - 4-dose series) 11/04/1988    HEPATITIS B IMMUNIZATION (1 of 3 - 19+ 3-dose series) Never done    YEARLY PREVENTIVE VISIT  05/27/2023    COVID-19 Vaccine (4 - 2023-24 season) 09/01/2023                  " through tachycardia.  This is asymptomatic.  Criteria for bacterial sinusitis is met.  Prescription for Augmentin has been sent.  Tylenol and ibuprofen as needed for symptomatic relief.  - amoxicillin-clavulanate (AUGMENTIN) 875-125 MG Tab; Take 1 Tablet by mouth 2 times a day for 5 days.  Dispense: 10 Tablet; Refill: 0          Instructed to return to Urgent Care or nearest Emergency Department if symptoms fail to improve, for any change in condition, further concerns, or new concerning symptoms. Patient states understanding of the plan of care and discharge instructions.    Nallely Mckeon M.D.

## 2024-06-26 LAB
TESTOST FREE SERPL-MCNC: 7.23 NG/DL
TESTOST SERPL-MCNC: 262 NG/DL (ref 240–950)

## 2024-07-22 ENCOUNTER — VIRTUAL VISIT (OUTPATIENT)
Dept: UROLOGY | Facility: CLINIC | Age: 39
End: 2024-07-22
Attending: PHYSICIAN ASSISTANT
Payer: COMMERCIAL

## 2024-07-22 DIAGNOSIS — Z30.09 VASECTOMY EVALUATION: ICD-10-CM

## 2024-07-22 PROCEDURE — 99243 OFF/OP CNSLTJ NEW/EST LOW 30: CPT | Mod: 95 | Performed by: UROLOGY

## 2024-07-22 NOTE — PROGRESS NOTES
"Sachin is a 38 year old who is being evaluated via a billable video visit.    How would you like to obtain your AVS? MyChart  If the video visit is dropped, the invitation should be resent by: Text to cell phone: [phone]  Will anyone else be joining your video visit? No          Subjective   Sachin is a 38 year old, presenting for the following health issues:  Sterilization    HPI     Patient is a 38 y.o. male who was requested to be seen by Mukesh Carlson for vasectomy.  He has no kids.      Review of Systems  Constitutional, neuro, ENT, endocrine, pulmonary, cardiac, gastrointestinal, genitourinary, musculoskeletal, integument and psychiatric systems are negative, except as otherwise noted.      Objective    Vitals - Patient Reported  Weight (Patient Reported): 108.9 kg (240 lb)  Height (Patient Reported): 188 cm (6' 2\")  BMI (Based on Pt Reported Ht/Wt): 30.81  Pain Score: No Pain (0)        Physical Exam   GENERAL: alert and no distress  EYES: Eyes grossly normal to inspection.  No discharge or erythema, or obvious scleral/conjunctival abnormalities.  RESP: No audible wheeze, cough, or visible cyanosis.    SKIN: Visible skin clear. No significant rash, abnormal pigmentation or lesions.  NEURO: Cranial nerves grossly intact.  Mentation and speech appropriate for age.  PSYCH: Appropriate affect, tone, and pace of words    Discussed    That vasectomy is permanent method of birth control.    That vasectomy can fail due to recanalization of the vas even many months/years later.    That he needs 2 negative sperm checks to be considered sterile    That there are other methods that are not permanent and also that the sperm can be frozen for later use.    How the technique is performed, risks of infection, bleeding, damage to the testes vessels and testes atrophy    Long term complication such as chronic and difficult to treat testes pain and questionable increase incidence of prostate cancer    That the procedure can " be done at the clinic or hospital OR        Plan:    Stop Aspirin  Will schedule Vasectomy in the future      Video-Visit Details    Type of service:  Video Visit   Originating Location (pt. Location): Home    Distant Location (provider location):  On-site  Platform used for Video Visit: Doximjeremiah  Signed Electronically by: Nickolas Jeffries MD

## 2024-07-26 ENCOUNTER — TELEPHONE (OUTPATIENT)
Dept: UROLOGY | Facility: CLINIC | Age: 39
End: 2024-07-26
Payer: COMMERCIAL

## 2024-07-26 NOTE — TELEPHONE ENCOUNTER
[facility] Call Center    Phone Message    May a detailed message be left on voicemail: Yes    Reason for Call: Other: Patient called to schedule his vasectomy procedure. Please call patient back to schedule.     Action Taken: Other: [facility] urology    Travel Screening: Not Applicable

## 2024-08-28 ENCOUNTER — OFFICE VISIT (OUTPATIENT)
Dept: UROLOGY | Facility: CLINIC | Age: 39
End: 2024-08-28
Payer: COMMERCIAL

## 2024-08-28 DIAGNOSIS — Z30.2 ENCOUNTER FOR VASECTOMY: Primary | ICD-10-CM

## 2024-08-28 PROCEDURE — 55250 REMOVAL OF SPERM DUCT(S): CPT | Performed by: UROLOGY

## 2024-08-28 PROCEDURE — 88302 TISSUE EXAM BY PATHOLOGIST: CPT | Performed by: PATHOLOGY

## 2024-08-30 LAB
PATH REPORT.COMMENTS IMP SPEC: NORMAL
PATH REPORT.COMMENTS IMP SPEC: NORMAL
PATH REPORT.FINAL DX SPEC: NORMAL
PATH REPORT.GROSS SPEC: NORMAL
PATH REPORT.MICROSCOPIC SPEC OTHER STN: NORMAL
PATH REPORT.RELEVANT HX SPEC: NORMAL
PHOTO IMAGE: NORMAL

## 2024-10-12 ENCOUNTER — HEALTH MAINTENANCE LETTER (OUTPATIENT)
Age: 39
End: 2024-10-12

## 2024-11-15 NOTE — TELEPHONE ENCOUNTER
Copied from 06/04/2018 telephone encounter, for the 500mg strength:     Prior Authorization Specialty Medication Request     Medication/Dose: Kuvan 100mg and 500mg powder  ICD code (if different than what is on RX):    PKU (phenylketonuria) (H)  E70.0         Previously Tried and Failed:       Important Lab Values: see phe labs  Rationale:      Pharmacy Information (if different than what is on RX)  Name:  [facility]  Phone:     minimum assist (75% patients effort)

## 2025-01-20 DIAGNOSIS — E29.1 HYPOGONADISM MALE: ICD-10-CM

## 2025-01-20 RX ORDER — TESTOSTERONE GEL, 1% 10 MG/G
GEL TRANSDERMAL
Qty: 225 G | Refills: 2 | OUTPATIENT
Start: 2025-01-20

## 2025-01-20 RX ORDER — TESTOSTERONE GEL, 1% 10 MG/G
GEL TRANSDERMAL
Qty: 225 G | Refills: 0 | Status: SHIPPED | OUTPATIENT
Start: 2025-01-20 | End: 2025-01-22

## 2025-01-22 ENCOUNTER — OFFICE VISIT (OUTPATIENT)
Dept: FAMILY MEDICINE | Facility: CLINIC | Age: 40
End: 2025-01-22
Payer: COMMERCIAL

## 2025-01-22 VITALS
DIASTOLIC BLOOD PRESSURE: 70 MMHG | BODY MASS INDEX: 30.57 KG/M2 | HEIGHT: 74 IN | HEART RATE: 87 BPM | SYSTOLIC BLOOD PRESSURE: 102 MMHG | WEIGHT: 238.2 LBS | OXYGEN SATURATION: 98 % | RESPIRATION RATE: 16 BRPM | TEMPERATURE: 97.2 F

## 2025-01-22 DIAGNOSIS — M08.3: ICD-10-CM

## 2025-01-22 DIAGNOSIS — Z13.6 CARDIOVASCULAR SCREENING; LDL GOAL LESS THAN 130: ICD-10-CM

## 2025-01-22 DIAGNOSIS — E29.1 HYPOGONADISM MALE: Primary | ICD-10-CM

## 2025-01-22 DIAGNOSIS — F41.1 GAD (GENERALIZED ANXIETY DISORDER): ICD-10-CM

## 2025-01-22 DIAGNOSIS — F33.1 MODERATE EPISODE OF RECURRENT MAJOR DEPRESSIVE DISORDER (H): ICD-10-CM

## 2025-01-22 LAB
ANION GAP SERPL CALCULATED.3IONS-SCNC: 11 MMOL/L (ref 7–15)
BUN SERPL-MCNC: 8.7 MG/DL (ref 6–20)
CALCIUM SERPL-MCNC: 9.7 MG/DL (ref 8.8–10.4)
CHLORIDE SERPL-SCNC: 105 MMOL/L (ref 98–107)
CHOLEST SERPL-MCNC: 97 MG/DL
CREAT SERPL-MCNC: 0.87 MG/DL (ref 0.67–1.17)
EGFRCR SERPLBLD CKD-EPI 2021: >90 ML/MIN/1.73M2
ERYTHROCYTE [DISTWIDTH] IN BLOOD BY AUTOMATED COUNT: 12.2 % (ref 10–15)
FASTING STATUS PATIENT QL REPORTED: YES
FASTING STATUS PATIENT QL REPORTED: YES
GLUCOSE SERPL-MCNC: 103 MG/DL (ref 70–99)
HCO3 SERPL-SCNC: 26 MMOL/L (ref 22–29)
HCT VFR BLD AUTO: 42.2 % (ref 40–53)
HDLC SERPL-MCNC: 32 MG/DL
HGB BLD-MCNC: 14.5 G/DL (ref 13.3–17.7)
LDLC SERPL CALC-MCNC: 52 MG/DL
MCH RBC QN AUTO: 31.3 PG (ref 26.5–33)
MCHC RBC AUTO-ENTMCNC: 34.4 G/DL (ref 31.5–36.5)
MCV RBC AUTO: 91 FL (ref 78–100)
NONHDLC SERPL-MCNC: 65 MG/DL
PLATELET # BLD AUTO: 309 10E3/UL (ref 150–450)
POTASSIUM SERPL-SCNC: 4.7 MMOL/L (ref 3.4–5.3)
PSA SERPL DL<=0.01 NG/ML-MCNC: 0.21 NG/ML (ref 0–2.5)
RBC # BLD AUTO: 4.63 10E6/UL (ref 4.4–5.9)
SHBG SERPL-SCNC: 18 NMOL/L (ref 11–80)
SODIUM SERPL-SCNC: 142 MMOL/L (ref 135–145)
TRIGL SERPL-MCNC: 65 MG/DL
WBC # BLD AUTO: 4.2 10E3/UL (ref 4–11)

## 2025-01-22 PROCEDURE — 85027 COMPLETE CBC AUTOMATED: CPT | Performed by: PHYSICIAN ASSISTANT

## 2025-01-22 PROCEDURE — 84270 ASSAY OF SEX HORMONE GLOBUL: CPT | Performed by: PHYSICIAN ASSISTANT

## 2025-01-22 PROCEDURE — G0103 PSA SCREENING: HCPCS | Performed by: PHYSICIAN ASSISTANT

## 2025-01-22 PROCEDURE — 99214 OFFICE O/P EST MOD 30 MIN: CPT | Performed by: PHYSICIAN ASSISTANT

## 2025-01-22 PROCEDURE — 36415 COLL VENOUS BLD VENIPUNCTURE: CPT | Performed by: PHYSICIAN ASSISTANT

## 2025-01-22 PROCEDURE — 80048 BASIC METABOLIC PNL TOTAL CA: CPT | Performed by: PHYSICIAN ASSISTANT

## 2025-01-22 PROCEDURE — 80061 LIPID PANEL: CPT | Performed by: PHYSICIAN ASSISTANT

## 2025-01-22 PROCEDURE — G2211 COMPLEX E/M VISIT ADD ON: HCPCS | Performed by: PHYSICIAN ASSISTANT

## 2025-01-22 RX ORDER — TESTOSTERONE GEL, 1% 10 MG/G
GEL TRANSDERMAL
Qty: 225 G | Refills: 5 | Status: SHIPPED | OUTPATIENT
Start: 2025-01-22

## 2025-01-22 RX ORDER — FLUOXETINE 40 MG/1
40 CAPSULE ORAL DAILY
Qty: 90 CAPSULE | Refills: 3 | Status: SHIPPED | OUTPATIENT
Start: 2025-01-22

## 2025-01-22 ASSESSMENT — PATIENT HEALTH QUESTIONNAIRE - PHQ9
5. POOR APPETITE OR OVEREATING: SEVERAL DAYS
SUM OF ALL RESPONSES TO PHQ QUESTIONS 1-9: 10
10. IF YOU CHECKED OFF ANY PROBLEMS, HOW DIFFICULT HAVE THESE PROBLEMS MADE IT FOR YOU TO DO YOUR WORK, TAKE CARE OF THINGS AT HOME, OR GET ALONG WITH OTHER PEOPLE: SOMEWHAT DIFFICULT
SUM OF ALL RESPONSES TO PHQ QUESTIONS 1-9: 10

## 2025-01-22 ASSESSMENT — ANXIETY QUESTIONNAIRES
6. BECOMING EASILY ANNOYED OR IRRITABLE: MORE THAN HALF THE DAYS
3. WORRYING TOO MUCH ABOUT DIFFERENT THINGS: MORE THAN HALF THE DAYS
1. FEELING NERVOUS, ANXIOUS, OR ON EDGE: MORE THAN HALF THE DAYS
5. BEING SO RESTLESS THAT IT IS HARD TO SIT STILL: NOT AT ALL
GAD7 TOTAL SCORE: 9
IF YOU CHECKED OFF ANY PROBLEMS ON THIS QUESTIONNAIRE, HOW DIFFICULT HAVE THESE PROBLEMS MADE IT FOR YOU TO DO YOUR WORK, TAKE CARE OF THINGS AT HOME, OR GET ALONG WITH OTHER PEOPLE: SOMEWHAT DIFFICULT
2. NOT BEING ABLE TO STOP OR CONTROL WORRYING: NOT AT ALL
2. NOT BEING ABLE TO STOP OR CONTROL WORRYING: NOT AT ALL
IF YOU CHECKED OFF ANY PROBLEMS ON THIS QUESTIONNAIRE, HOW DIFFICULT HAVE THESE PROBLEMS MADE IT FOR YOU TO DO YOUR WORK, TAKE CARE OF THINGS AT HOME, OR GET ALONG WITH OTHER PEOPLE: SOMEWHAT DIFFICULT
5. BEING SO RESTLESS THAT IT IS HARD TO SIT STILL: NOT AT ALL
7. FEELING AFRAID AS IF SOMETHING AWFUL MIGHT HAPPEN: MORE THAN HALF THE DAYS
6. BECOMING EASILY ANNOYED OR IRRITABLE: SEVERAL DAYS
GAD7 TOTAL SCORE: 5
GAD7 TOTAL SCORE: 5
7. FEELING AFRAID AS IF SOMETHING AWFUL MIGHT HAPPEN: MORE THAN HALF THE DAYS
7. FEELING AFRAID AS IF SOMETHING AWFUL MIGHT HAPPEN: MORE THAN HALF THE DAYS
1. FEELING NERVOUS, ANXIOUS, OR ON EDGE: SEVERAL DAYS
GAD7 TOTAL SCORE: 5
3. WORRYING TOO MUCH ABOUT DIFFERENT THINGS: SEVERAL DAYS
4. TROUBLE RELAXING: NOT AT ALL
8. IF YOU CHECKED OFF ANY PROBLEMS, HOW DIFFICULT HAVE THESE MADE IT FOR YOU TO DO YOUR WORK, TAKE CARE OF THINGS AT HOME, OR GET ALONG WITH OTHER PEOPLE?: SOMEWHAT DIFFICULT

## 2025-01-22 ASSESSMENT — PAIN SCALES - GENERAL: PAINLEVEL_OUTOF10: NO PAIN (0)

## 2025-01-22 NOTE — NURSING NOTE
"No chief complaint on file.      Initial There were no vitals taken for this visit. Estimated body mass index is 31.33 kg/m  as calculated from the following:    Height as of 6/21/24: 1.88 m (6' 2\").    Weight as of 6/21/24: 110.7 kg (244 lb).    Patient presents to the clinic using No DME    Is there anyone who you would like to be able to receive your results? No  If yes have patient fill out PARUL      "

## 2025-01-22 NOTE — PROGRESS NOTES
"  Assessment & Plan   Hypogonadism male  Patient reports no improvement in fatigue since starting topical testosterone. He does note that he does not use the medication daily and forgets to take it at times. Recommend continuing use of topical testosterone. Will recheck labs today.  - testosterone (ANDROGEL 1 % PUMP) 12.5 MG/ACT (1%) gel; USE 5 PUMPS ON SKIN OF SHOULDER,UPPER ARM, OR ABDOMEN ONCE DAILY  - Testosterone Free and Total; Future  - PSA, screen; Future  - CBC with platelets; Future    Moderate episode of recurrent major depressive disorder (H)  BRADEN (generalized anxiety disorder)  Patient reports decreased energy, difficulty getting out of bed, and increased sleep. He had improvement in his anxiety and depression when first starting Fluoxetine, but feels like things are stagnant now. Recommended increasing Fluoxetine and restarting therapy. Follow up in 6 months to reevaluate.   - FLUoxetine (PROZAC) 40 MG capsule; Take 1 capsule (40 mg) by mouth daily.  - Basic metabolic panel  (Ca, Cl, CO2, Creat, Gluc, K, Na, BUN); Future    Juvenile seronegative polyarthritis (H)  Currently treating with NSAIDs alone. Previously [facility]. Trialed Plaquenil and Piroxicam but stopped. Continue NSAIDs for now.     CARDIOVASCULAR SCREENING; LDL GOAL LESS THAN 130  Due for recheck.   - Lipid panel reflex to direct LDL Fasting; Future    The longitudinal plan of care for the diagnosis(es)/condition(s) as documented were addressed during this visit. Due to the added complexity in care, I will continue to support Sachin in the subsequent management and with ongoing continuity of care.     BMI  Estimated body mass index is 30.57 kg/m  as calculated from the following:    Height as of this encounter: 1.88 m (6' 2.02\").    Weight as of this encounter: 108 kg (238 lb 3.2 oz).       Subjective   Sachin is a 39 year old, presenting for the following health issues:  Mental Health Problem        1/22/2025     9:01 " AM   Additional Questions   Roomed by Lisset HARRIS([facility])     Mental Health Problem         Depression and Anxiety   How are you doing with your depression since your last visit? No change  How are you doing with your anxiety since your last visit?  No change  Are you having other symptoms that might be associated with depression or anxiety? No  Have you had a significant life event? Relationship Concerns and Financial Concerns   Do you have any concerns with your use of alcohol or other drugs? No    Social History     Tobacco Use    Smoking status: Former     Types: Cigarettes    Smokeless tobacco: Former     Types: Chew    Tobacco comments:     smoked for 7 years; smoked 1/2 ppd   Vaping Use    Vaping status: Never Used   Substance Use Topics    Alcohol use: Yes     Comment: weekends    Drug use: No         7/21/2023     7:05 AM 2/29/2024     6:47 AM 1/22/2025     8:59 AM   PHQ   PHQ-9 Total Score 1 4 10    Q9: Thoughts of better off dead/self-harm past 2 weeks Not at all Not at all Not at all       Patient-reported         2/29/2024     6:48 AM 1/22/2025     9:07 AM 1/22/2025     9:10 AM   BRADEN-7 SCORE   Total Score 9 (mild anxiety) 5 (mild anxiety)    Total Score 9 5  9       Patient-reported         1/22/2025     8:59 AM   Last PHQ-9   1.  Little interest or pleasure in doing things 1   2.  Feeling down, depressed, or hopeless 1   3.  Trouble falling or staying asleep, or sleeping too much 3   4.  Feeling tired or having little energy 2   5.  Poor appetite or overeating 1   6.  Feeling bad about yourself 1   7.  Trouble concentrating 1   8.  Moving slowly or restless 0   Q9: Thoughts of better off dead/self-harm past 2 weeks 0   PHQ-9 Total Score 10        Patient-reported         1/22/2025     9:10 AM   BRADEN-7    1. Feeling nervous, anxious, or on edge 2   2. Not being able to stop or control worrying 0   3. Worrying too much about different things 2   4. Trouble relaxing 1   5. Being so restless that it is hard to sit  "still 0   6. Becoming easily annoyed or irritable 2   7. Feeling afraid, as if something awful might happen 2   BRADEN-7 Total Score 9   If you checked any problems, how difficult have they made it for you to do your work, take care of things at home, or get along with other people? Somewhat difficult     Review of Systems  See HPI.       Objective    /70   Pulse 87   Temp 97.2  F (36.2  C) (Tympanic)   Resp 16   Ht 1.88 m (6' 2.02\")   Wt 108 kg (238 lb 3.2 oz)   SpO2 98%   BMI 30.57 kg/m    Body mass index is 30.57 kg/m .  Physical Exam   GENERAL: alert and no distress  NECK: no adenopathy, no asymmetry, masses, or scars  RESP: lungs clear to auscultation - no rales, rhonchi or wheezes  CV: regular rate and rhythm, normal S1 S2, no S3 or S4, no murmur, click or rub, no peripheral edema  MS: no gross musculoskeletal defects noted, no edema  PSYCH: mentation appears normal, affect normal/bright      Physician Attestation   I, Mukesh Carlson PA-C, was present with the medical/DAQUAN student who participated in the service and in the documentation of the note.  I have verified the history and personally performed the physical exam and medical decision making.  I agree with the assessment and plan of care as documented in the note.      Mukesh Carlson PA-C     Answers submitted by the patient for this visit:  Patient Health Questionnaire (Submitted on 1/22/2025)  If you checked off any problems, how difficult have these problems made it for you to do your work, take care of things at home, or get along with other people?: Somewhat difficult  PHQ9 TOTAL SCORE: 10    "

## 2025-01-23 ASSESSMENT — ANXIETY QUESTIONNAIRES: GAD7 TOTAL SCORE: 5

## 2025-04-22 ENCOUNTER — PATIENT OUTREACH (OUTPATIENT)
Dept: CARE COORDINATION | Facility: CLINIC | Age: 40
End: 2025-04-22
Payer: COMMERCIAL

## 2025-05-14 ENCOUNTER — OFFICE VISIT (OUTPATIENT)
Dept: FAMILY MEDICINE | Facility: CLINIC | Age: 40
End: 2025-05-14
Payer: COMMERCIAL

## 2025-05-14 VITALS
BODY MASS INDEX: 29.34 KG/M2 | OXYGEN SATURATION: 96 % | TEMPERATURE: 97.9 F | WEIGHT: 228.6 LBS | HEART RATE: 66 BPM | DIASTOLIC BLOOD PRESSURE: 76 MMHG | SYSTOLIC BLOOD PRESSURE: 110 MMHG | RESPIRATION RATE: 20 BRPM | HEIGHT: 74 IN

## 2025-05-14 DIAGNOSIS — S30.861A TICK BITE OF ABDOMINAL WALL, INITIAL ENCOUNTER: Primary | ICD-10-CM
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
## 2025-05-14 PROCEDURE — 3078F DIAST BP <80 MM HG: CPT | Performed by: FAMILY MEDICINE

## 2025-05-14 PROCEDURE — 99213 OFFICE O/P EST LOW 20 MIN: CPT | Performed by: FAMILY MEDICINE

## 2025-05-14 PROCEDURE — 3074F SYST BP LT 130 MM HG: CPT | Performed by: FAMILY MEDICINE

## 2025-05-14 PROCEDURE — 1126F AMNT PAIN NOTED NONE PRSNT: CPT | Performed by: FAMILY MEDICINE

## 2025-05-14 RX ORDER — DOXYCYCLINE HYCLATE 100 MG
100 TABLET ORAL 2 TIMES DAILY
Qty: 20 TABLET | Refills: 0 | Status: SHIPPED | OUTPATIENT
Start: 2025-05-14 | End: 2025-05-24

## 2025-05-14 ASSESSMENT — ANXIETY QUESTIONNAIRES
GAD7 TOTAL SCORE: 11
4. TROUBLE RELAXING: SEVERAL DAYS
8. IF YOU CHECKED OFF ANY PROBLEMS, HOW DIFFICULT HAVE THESE MADE IT FOR YOU TO DO YOUR WORK, TAKE CARE OF THINGS AT HOME, OR GET ALONG WITH OTHER PEOPLE?: SOMEWHAT DIFFICULT
7. FEELING AFRAID AS IF SOMETHING AWFUL MIGHT HAPPEN: MORE THAN HALF THE DAYS
IF YOU CHECKED OFF ANY PROBLEMS ON THIS QUESTIONNAIRE, HOW DIFFICULT HAVE THESE PROBLEMS MADE IT FOR YOU TO DO YOUR WORK, TAKE CARE OF THINGS AT HOME, OR GET ALONG WITH OTHER PEOPLE: SOMEWHAT DIFFICULT
1. FEELING NERVOUS, ANXIOUS, OR ON EDGE: MORE THAN HALF THE DAYS
5. BEING SO RESTLESS THAT IT IS HARD TO SIT STILL: SEVERAL DAYS
GAD7 TOTAL SCORE: 11
GAD7 TOTAL SCORE: 11
7. FEELING AFRAID AS IF SOMETHING AWFUL MIGHT HAPPEN: MORE THAN HALF THE DAYS
6. BECOMING EASILY ANNOYED OR IRRITABLE: SEVERAL DAYS
3. WORRYING TOO MUCH ABOUT DIFFERENT THINGS: MORE THAN HALF THE DAYS
2. NOT BEING ABLE TO STOP OR CONTROL WORRYING: MORE THAN HALF THE DAYS

## 2025-05-14 ASSESSMENT — PATIENT HEALTH QUESTIONNAIRE - PHQ9
SUM OF ALL RESPONSES TO PHQ QUESTIONS 1-9: 10
SUM OF ALL RESPONSES TO PHQ QUESTIONS 1-9: 10
10. IF YOU CHECKED OFF ANY PROBLEMS, HOW DIFFICULT HAVE THESE PROBLEMS MADE IT FOR YOU TO DO YOUR WORK, TAKE CARE OF THINGS AT HOME, OR GET ALONG WITH OTHER PEOPLE: SOMEWHAT DIFFICULT

## 2025-05-14 ASSESSMENT — PAIN SCALES - GENERAL: PAINLEVEL_OUTOF10: NO PAIN (0)

## 2025-05-14 NOTE — PROGRESS NOTES
"  Assessment & Plan     Tick bite of abdominal wall, initial encounter  Management options discussed and doxycycline prescribed to cover possible Lyme's infection.  Return criteria explained.  All questions answered.  - doxycycline hyclate (VIBRA-TABS) 100 MG tablet; Take 1 tablet (100 mg) by mouth 2 times daily for 10 days.      Sammy Stephenson is a 39 year old, presenting for the following health issues:  Tick Bite (Left flank)        5/14/2025     2:11 PM   Additional Questions   Roomed by Reema OLMSTEAD CMA     History of Present Illness       Reason for visit:  Deer tick bite-left flank  Symptom onset:  3-7 days ago  Symptoms include:  Exhausted, tick bite is less red  Symptom intensity:  Mild  Symptom progression:  Staying the same  Had these symptoms before:  Yes  Has tried/received treatment for these symptoms:  No He is missing 1 dose(s) of medications per week.  He is not taking prescribed medications regularly due to remembering to take.        Review of Systems  Constitutional, HEENT, cardiovascular, pulmonary, gi and gu systems are negative, except as otherwise noted.      Objective    /76 (BP Location: Right arm, Patient Position: Sitting, Cuff Size: Adult Large)   Pulse 66   Temp 97.9  F (36.6  C) (Tympanic)   Resp 20   Ht 1.88 m (6' 2\")   Wt 103.7 kg (228 lb 9.6 oz)   SpO2 96%   BMI 29.35 kg/m    Body mass index is 29.35 kg/m .  Physical Exam   GENERAL: alert and no distress  EYES: Eyes grossly normal to inspection, PERRL and conjunctivae and sclerae normal  RESP: lungs clear to auscultation - no rales, rhonchi or wheezes  CV: regular rates and rhythm, normal S1 S2, no S3 or S4, and no murmur, click or rub  MS: no gross musculoskeletal defects noted, no edema  SKIN: small erythematous rash involving left sided abdominal wall skin with central bite dang, no vesicles or discharge noted  NEURO: Normal strength and tone, mentation intact and speech normal  PSYCH: mentation appears normal, " affect normal/bright            Signed Electronically by: Herrera Hernandez MD

## 2025-08-05 DIAGNOSIS — E29.1 HYPOGONADISM MALE: ICD-10-CM

## 2025-08-05 RX ORDER — TESTOSTERONE GEL, 1% 10 MG/G
GEL TRANSDERMAL
Qty: 225 G | Refills: 0 | Status: SHIPPED | OUTPATIENT
Start: 2025-08-05